# Patient Record
Sex: MALE | Race: WHITE | NOT HISPANIC OR LATINO | Employment: FULL TIME | URBAN - NONMETROPOLITAN AREA
[De-identification: names, ages, dates, MRNs, and addresses within clinical notes are randomized per-mention and may not be internally consistent; named-entity substitution may affect disease eponyms.]

---

## 2018-12-18 ENCOUNTER — OFFICE VISIT CONVERTED (OUTPATIENT)
Dept: FAMILY MEDICINE CLINIC | Age: 58
End: 2018-12-18
Attending: FAMILY MEDICINE

## 2019-03-25 ENCOUNTER — HOSPITAL ENCOUNTER (OUTPATIENT)
Dept: OTHER | Facility: HOSPITAL | Age: 59
Discharge: HOME | End: 2019-03-25
Attending: FAMILY MEDICINE

## 2019-03-25 ENCOUNTER — OFFICE VISIT CONVERTED (OUTPATIENT)
Dept: FAMILY MEDICINE CLINIC | Age: 59
End: 2019-03-25
Attending: FAMILY MEDICINE

## 2019-03-25 LAB — PSA SERPL-MCNC: 0.8 NG/ML (ref 0–4)

## 2019-05-13 ENCOUNTER — HOSPITAL ENCOUNTER (OUTPATIENT)
Dept: OTHER | Facility: HOSPITAL | Age: 59
Discharge: HOME | End: 2019-05-13
Attending: UROLOGY

## 2019-05-13 ENCOUNTER — OFFICE VISIT CONVERTED (OUTPATIENT)
Dept: UROLOGY | Facility: CLINIC | Age: 59
End: 2019-05-13
Attending: UROLOGY

## 2019-05-15 LAB — CONV RECTAL SCREEN FOR FLUOROQUINOLONE RESISTANT ORGANISMS: NORMAL

## 2019-06-14 ENCOUNTER — HOSPITAL ENCOUNTER (OUTPATIENT)
Dept: OTHER | Facility: HOSPITAL | Age: 59
Discharge: HOME | End: 2019-06-14
Attending: UROLOGY

## 2019-06-14 ENCOUNTER — PROCEDURE VISIT CONVERTED (OUTPATIENT)
Dept: UROLOGY | Facility: CLINIC | Age: 59
End: 2019-06-14
Attending: UROLOGY

## 2019-12-13 ENCOUNTER — OFFICE VISIT CONVERTED (OUTPATIENT)
Dept: FAMILY MEDICINE CLINIC | Age: 59
End: 2019-12-13
Attending: FAMILY MEDICINE

## 2019-12-16 ENCOUNTER — HOSPITAL ENCOUNTER (OUTPATIENT)
Dept: OTHER | Facility: HOSPITAL | Age: 59
Discharge: HOME | End: 2019-12-16
Attending: FAMILY MEDICINE

## 2019-12-16 LAB
ALBUMIN SERPL-MCNC: 4.4 G/DL (ref 3.5–5)
ALBUMIN/GLOB SERPL: 1.6 {RATIO} (ref 1.4–2.6)
ALP SERPL-CCNC: 77 U/L (ref 56–119)
ALT SERPL-CCNC: 22 U/L (ref 10–40)
ANION GAP SERPL CALC-SCNC: 16 MMOL/L (ref 8–19)
AST SERPL-CCNC: 20 U/L (ref 15–50)
BASOPHILS # BLD MANUAL: 0.04 10*3/UL (ref 0–0.2)
BASOPHILS NFR BLD MANUAL: 0.5 % (ref 0–3)
BILIRUB SERPL-MCNC: 0.56 MG/DL (ref 0.2–1.3)
BUN SERPL-MCNC: 16 MG/DL (ref 5–25)
BUN/CREAT SERPL: 16 {RATIO} (ref 6–20)
CALCIUM SERPL-MCNC: 9.6 MG/DL (ref 8.7–10.4)
CHLORIDE SERPL-SCNC: 97 MMOL/L (ref 99–111)
CHOLEST SERPL-MCNC: 172 MG/DL (ref 107–200)
CHOLEST/HDLC SERPL: 3.3 {RATIO} (ref 3–6)
CONV CO2: 26 MMOL/L (ref 22–32)
CONV TOTAL PROTEIN: 7.2 G/DL (ref 6.3–8.2)
CREAT UR-MCNC: 0.98 MG/DL (ref 0.7–1.2)
DEPRECATED RDW RBC AUTO: 44.4 FL
EOSINOPHIL # BLD MANUAL: 0.36 10*3/UL (ref 0–0.7)
EOSINOPHIL NFR BLD MANUAL: 4.6 % (ref 0–7)
ERYTHROCYTE [DISTWIDTH] IN BLOOD BY AUTOMATED COUNT: 12.8 % (ref 11.5–14.5)
GFR SERPLBLD BASED ON 1.73 SQ M-ARVRAT: >60 ML/MIN/{1.73_M2}
GLOBULIN UR ELPH-MCNC: 2.8 G/DL (ref 2–3.5)
GLUCOSE SERPL-MCNC: 126 MG/DL (ref 70–99)
GRANS (ABSOLUTE): 4.84 10*3/UL (ref 2–8)
GRANS: 61.4 % (ref 30–85)
HBA1C MFR BLD: 16 G/DL (ref 14–18)
HCT VFR BLD AUTO: 47.4 % (ref 42–52)
HDLC SERPL-MCNC: 52 MG/DL (ref 40–60)
IMM GRANULOCYTES # BLD: 0 10*3/UL (ref 0–0.54)
IMM GRANULOCYTES NFR BLD: 0 % (ref 0–0.43)
LDLC SERPL CALC-MCNC: 104 MG/DL (ref 70–100)
LYMPHOCYTES # BLD MANUAL: 1.85 10*3/UL (ref 1–5)
LYMPHOCYTES NFR BLD MANUAL: 10.1 % (ref 3–10)
MCH RBC QN AUTO: 31.6 PG (ref 27–31)
MCHC RBC AUTO-ENTMCNC: 33.8 G/DL (ref 33–37)
MCV RBC AUTO: 93.5 FL (ref 80–96)
MONOCYTES # BLD AUTO: 0.8 10*3/UL (ref 0.2–1.2)
OSMOLALITY SERPL CALC.SUM OF ELEC: 281 MOSM/KG (ref 273–304)
PLATELET # BLD AUTO: 244 10*3/UL (ref 130–400)
PMV BLD AUTO: 11.1 FL (ref 7.4–10.4)
POTASSIUM SERPL-SCNC: 4.8 MMOL/L (ref 3.5–5.3)
RBC # BLD AUTO: 5.07 10*6/UL (ref 4.7–6.1)
SODIUM SERPL-SCNC: 134 MMOL/L (ref 135–147)
TRIGL SERPL-MCNC: 81 MG/DL (ref 40–150)
TSH SERPL-ACNC: 1.3 M[IU]/L (ref 0.27–4.2)
VARIANT LYMPHS NFR BLD MANUAL: 23.4 % (ref 20–45)
VLDLC SERPL-MCNC: 16 MG/DL (ref 5–37)
WBC # BLD AUTO: 7.89 10*3/UL (ref 4.8–10.8)

## 2019-12-17 LAB
EST. AVERAGE GLUCOSE BLD GHB EST-MCNC: 126 MG/DL
HBA1C MFR BLD: 6 % (ref 3.5–5.7)

## 2020-01-14 ENCOUNTER — HOSPITAL ENCOUNTER (OUTPATIENT)
Dept: OTHER | Facility: HOSPITAL | Age: 60
Discharge: HOME | End: 2020-01-14
Attending: FAMILY MEDICINE

## 2020-07-07 ENCOUNTER — HOSPITAL ENCOUNTER (OUTPATIENT)
Dept: OTHER | Facility: HOSPITAL | Age: 60
Discharge: HOME | End: 2020-07-07
Attending: FAMILY MEDICINE

## 2020-07-07 LAB
ANION GAP SERPL CALC-SCNC: 20 MMOL/L (ref 8–19)
BUN SERPL-MCNC: 17 MG/DL (ref 5–25)
BUN/CREAT SERPL: 19 {RATIO} (ref 6–20)
CALCIUM SERPL-MCNC: 9.7 MG/DL (ref 8.7–10.4)
CHLORIDE SERPL-SCNC: 106 MMOL/L (ref 99–111)
CONV CO2: 19 MMOL/L (ref 22–32)
CREAT UR-MCNC: 0.89 MG/DL (ref 0.7–1.2)
GFR SERPLBLD BASED ON 1.73 SQ M-ARVRAT: >60 ML/MIN/{1.73_M2}
GLUCOSE SERPL-MCNC: 119 MG/DL (ref 70–99)
OSMOLALITY SERPL CALC.SUM OF ELEC: 293 MOSM/KG (ref 273–304)
POTASSIUM SERPL-SCNC: 4.5 MMOL/L (ref 3.5–5.3)
PSA SERPL-MCNC: 1.08 NG/ML (ref 0–4)
SODIUM SERPL-SCNC: 140 MMOL/L (ref 135–147)

## 2020-10-26 ENCOUNTER — OFFICE VISIT CONVERTED (OUTPATIENT)
Dept: PULMONOLOGY | Facility: CLINIC | Age: 60
End: 2020-10-26
Attending: INTERNAL MEDICINE

## 2020-10-26 ENCOUNTER — HOSPITAL ENCOUNTER (OUTPATIENT)
Dept: OTHER | Facility: HOSPITAL | Age: 60
Discharge: HOME | End: 2020-10-26
Attending: INTERNAL MEDICINE

## 2021-01-05 ENCOUNTER — OFFICE VISIT CONVERTED (OUTPATIENT)
Dept: FAMILY MEDICINE CLINIC | Age: 61
End: 2021-01-05
Attending: FAMILY MEDICINE

## 2021-02-19 ENCOUNTER — HOSPITAL ENCOUNTER (OUTPATIENT)
Dept: CT IMAGING | Facility: HOSPITAL | Age: 61
Discharge: HOME | End: 2021-02-19
Attending: THORACIC SURGERY (CARDIOTHORACIC VASCULAR SURGERY)

## 2021-04-02 ENCOUNTER — HOSPITAL ENCOUNTER (OUTPATIENT)
Dept: OTHER | Facility: HOSPITAL | Age: 61
Discharge: HOME | End: 2021-04-02
Attending: FAMILY MEDICINE

## 2021-04-02 LAB
ALBUMIN SERPL-MCNC: 4.4 G/DL (ref 3.5–5)
ALBUMIN/GLOB SERPL: 1.8 {RATIO} (ref 1.4–2.6)
ALP SERPL-CCNC: 80 U/L (ref 56–155)
ALT SERPL-CCNC: 32 U/L (ref 10–40)
ANION GAP SERPL CALC-SCNC: 14 MMOL/L (ref 8–19)
AST SERPL-CCNC: 25 U/L (ref 15–50)
BILIRUB SERPL-MCNC: 0.84 MG/DL (ref 0.2–1.3)
BUN SERPL-MCNC: 20 MG/DL (ref 5–25)
BUN/CREAT SERPL: 19 {RATIO} (ref 6–20)
CALCIUM SERPL-MCNC: 9.3 MG/DL (ref 8.7–10.4)
CHLORIDE SERPL-SCNC: 103 MMOL/L (ref 99–111)
CHOLEST SERPL-MCNC: 143 MG/DL (ref 107–200)
CHOLEST/HDLC SERPL: 2.1 {RATIO} (ref 3–6)
CONV CO2: 24 MMOL/L (ref 22–32)
CONV TOTAL PROTEIN: 6.9 G/DL (ref 6.3–8.2)
CREAT UR-MCNC: 1.04 MG/DL (ref 0.7–1.2)
ERYTHROCYTE [DISTWIDTH] IN BLOOD BY AUTOMATED COUNT: 13 % (ref 11.5–14.5)
GFR SERPLBLD BASED ON 1.73 SQ M-ARVRAT: >60 ML/MIN/{1.73_M2}
GLOBULIN UR ELPH-MCNC: 2.5 G/DL (ref 2–3.5)
GLUCOSE SERPL-MCNC: 113 MG/DL (ref 70–99)
HBA1C MFR BLD: 15.3 G/DL (ref 14–18)
HCT VFR BLD AUTO: 44.9 % (ref 42–52)
HDLC SERPL-MCNC: 67 MG/DL (ref 40–60)
LDLC SERPL CALC-MCNC: 64 MG/DL (ref 70–100)
MCH RBC QN AUTO: 31 PG (ref 27–31)
MCHC RBC AUTO-ENTMCNC: 34.1 G/DL (ref 33–37)
MCV RBC AUTO: 90.9 FL (ref 80–96)
OSMOLALITY SERPL CALC.SUM OF ELEC: 287 MOSM/KG (ref 273–304)
PLATELET # BLD AUTO: 252 10*3/UL (ref 130–400)
PMV BLD AUTO: 10.8 FL (ref 7.4–10.4)
POTASSIUM SERPL-SCNC: 4.4 MMOL/L (ref 3.5–5.3)
RBC # BLD AUTO: 4.94 10*6/UL (ref 4.7–6.1)
SODIUM SERPL-SCNC: 137 MMOL/L (ref 135–147)
TRIGL SERPL-MCNC: 62 MG/DL (ref 40–150)
TSH SERPL-ACNC: 0.79 M[IU]/L (ref 0.27–4.2)
VLDLC SERPL-MCNC: 12 MG/DL (ref 5–37)
WBC # BLD AUTO: 6.43 10*3/UL (ref 4.8–10.8)

## 2021-04-03 LAB
EST. AVERAGE GLUCOSE BLD GHB EST-MCNC: 126 MG/DL
HBA1C MFR BLD: 6 % (ref 3.5–5.7)

## 2021-05-15 VITALS — RESPIRATION RATE: 16 BRPM | BODY MASS INDEX: 30.37 KG/M2 | HEIGHT: 72 IN | WEIGHT: 224.25 LBS

## 2021-05-15 VITALS — HEIGHT: 72 IN | WEIGHT: 227 LBS | BODY MASS INDEX: 30.75 KG/M2 | RESPIRATION RATE: 16 BRPM

## 2021-05-18 NOTE — PROGRESS NOTES
"Ry Santo 1960     Office/Outpatient Visit    Visit Date: Tue, Dec 18, 2018 03:59 pm    Provider: Ward Rdz MD (Assistant: Mindy Santo RN)    Location: [facility]        Electronically signed by Ward Rdz MD on  12/21/2018 12:48:09 PM                             SUBJECTIVE:        CC: physical exam         HPI:     Simin is in today for wellness exam.  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  He has been with his current company for 18 years.  He quit smoking in 2005 or so.  He has just about quit using alcohol.  He is up to date on colonoscopy and has no history of colon cancer.  He does have hypertension and remains on treatment for that.     ROS:     CONSTITUTIONAL:  Negative for chills and fever.      CARDIOVASCULAR:  Negative for chest pain and palpitations.      RESPIRATORY:  Negative for recent cough and dyspnea.      GASTROINTESTINAL:  Negative for abdominal pain, nausea and vomiting.      INTEGUMENTARY:  Negative for atypical mole(s) and rash.          [facility]/:     Last Reviewed on 12/18/2018 04:13 PM by Ward Rdz    Past Medical History:             PAST MEDICAL HISTORY         Hypertension         PREVENTIVE HEALTH MAINTENANCE             COLORECTAL CANCER SCREENING: Up to date (colonoscopy q10y; sigmoidoscopy q5y; Cologuard q3y) was last done 05/2012; colonoscopy with normal results; diverticulosis         Surgical History:         L Fingers;         Family History:         Positive for Coronary Artery Disease ( father ).      Positive for Type 2 Diabetes ( mother ).          Social History:     Occupation: Employed at [employer]     Marital Status:      Children: 1 child         Tobacco/Alcohol/Supplements:     Last Reviewed on 12/18/2018 04:13 PM by Ward Rdz    Tobacco: Past history of cigarette smoking; quit February 2008.  \"twelve-pack a week\"         Substance Abuse History:     Last Reviewed on 12/18/2018 " 04:13 PM by Ward Rdz    NEGATIVE         Mental Health History:     Last Reviewed on 12/18/2018 04:13 PM by Ward Rdz        Communicable Diseases (eg STDs):     Last Reviewed on 12/18/2018 04:13 PM by Ward Rdz            Current Problems:     Last Reviewed on 12/18/2018 04:13 PM by Ward Rdz    Hypertension     Erectile dysfunction due to organic reasons     Screening for rectal cancer     Screening for prostate cancer         Immunizations:     None        Allergies:     Last Reviewed on 12/18/2018 04:13 PM by Ward Rdz    Penicillins:    Aspirin:        Current Medications:     Last Reviewed on 12/18/2018 04:13 PM by Ward Rdz    Lisinopril/Hydrochlorothiazide 20mg/12.5mg Tablet Take 1 tablet(s) by mouth daily     Multivitamins         OBJECTIVE:        Vitals:         Current: 12/18/2018 4:01:49 PM    Ht:  6 ft, 1.75 in;  Wt: 232 lbs;  BMI: 30.0    T: 98 F (oral);  BP: 126/82 mm Hg (left arm, sitting);  P: 91 bpm (left arm (BP Cuff), sitting);  sCr: 0.89 mg/dL;  GFR: 100.17        Exams:     PHYSICAL EXAM:     GENERAL: Vitals recorded well developed, well nourished;     EYES: extraocular movements intact; conjunctiva and cornea are normal; PERRL;     E/N/T: EARS:  normal external auditory canals and tympanic membranes;  grossly normal hearing; OROPHARYNX:  normal mucosa, dentition, gingiva, and posterior pharynx;     NECK: range of motion is normal; thyroid is non-palpable;     RESPIRATORY: normal respiratory rate and pattern with no distress; normal breath sounds with no rales, rhonchi, wheezes or rubs;     CARDIOVASCULAR: normal rate; rhythm is regular;  no systolic murmur;     GASTROINTESTINAL: nontender; normal bowel sounds; no masses; rectal exam: normal tone; nontender, guaiac negative stool;     GENITOURINARY: prostate:  no nodules, tenderness, or enlargement;     SKIN:  no significant rashes or lesions; no suspicious moles;      NEUROLOGIC: mental status: alert and oriented x 3; cranial nerves II-XII grossly intact;     PSYCHIATRIC: appropriate affect and demeanor; normal psychomotor function;         Procedures:     Vaccination against hepatitis A     1. Hepatitis A (adult): 1.0 ml given IM in the left upper arm; administered by [facility];  lot number [id number]; expires 11/18/19             ASSESSMENT           V70.0   Z00.00  Yearly physical exam              DDx:     V76.41   Z12.12  Screening for rectal cancer              DDx:     V76.44   Z12.5  Screening for prostate cancer              DDx:     V05.3   Z23  Vaccination against hepatitis A              DDx:         ORDERS:         Meds Prescribed:       Refill of: Lisinopril/Hydrochlorothiazide 20mg/12.5mg Tablet Take 1 tablet(s) by mouth daily  #90 (Ninety) tablet(s) Refills: 3         Radiology/Test Orders:       3017F  Colorectal CA screen results documented and reviewed (PV)  (In-House)           Lab Orders:       50090  Occult blood, fecal  (In-House)         89897  [facility]  ([facility])         02412  [facility]: CMP, CBC,LIPID, PRSAS-, TSH 69128, 59697, 24500, 25339, , 27825  (Send-Out)           Procedures Ordered:       91649  Immunization administration; one vaccine  (In-House)         41256  HepA vaccine adult dose for intramuscular use  (In-House)           Other Orders:         Negative EtOH screen  (In-House)           Calculated BMI above the upper parameter and a follow-up plan was documented in the medical record  (In-House)           Depression screen negative  (In-House)                   PLAN:          Yearly physical exam     LABORATORY:  Labs ordered to be performed today include HgbA1C and MALE PHYSICAL: CMP, CBC, LIPID, PSA, TSH.      RECOMMENDATIONS given include: Today, we have reviewed Simin's care.  I'm going to arrange labs for him as noted below and go from there.  The right side of his prostate is  slightly different than the left, but I don't think I feel a concerning nodule.  We will see what his blood work shows and go from there.  No changes are anticipated..  MIPS PHQ-9 Depression Screening Completed form scanned and in chart; Total Score 1 Negative alcohol screen     COLORECTAL CANCER SCREENING: Results are in chart     BMI Elevated - Follow-Up Plan: He was provided education on weight loss strategies           Orders:       93735  A1C - [facility] Hemoglobin A1C  (Send-Out)         42346  PHYS - [facility] MALE PHYSICAL: CMP, CBC,LIPID, PRSAS-, TSH 64276, 68097, 95920, 15015, , 91630  (Send-Out)           Negative EtOH screen  (In-House)         3017F  Colorectal CA screen results documented and reviewed (PV)  (In-House)           Calculated BMI above the upper parameter and a follow-up plan was documented in the medical record  (In-House)           Depression screen negative  (In-House)             Patient Education Handouts:       Physical Exam 50-59 year, Male           Screening for rectal cancer           Orders:       31958  Occult blood, fecal  (In-House)            Screening for prostate cancer As above.          Vaccination against hepatitis A           Orders:       04635  Immunization administration; one vaccine  (In-House)         68792  HepA vaccine adult dose for intramuscular use  (In-House)               Other Prescriptions:       Refill of: Lisinopril/Hydrochlorothiazide 20mg/12.5mg Tablet Take 1 tablet(s) by mouth daily  #90 (Ninety) tablet(s) Refills: 3         CHARGE CAPTURE           **Please note: ICD descriptions below are intended for billing purposes only and may not represent clinical diagnoses**        Primary Diagnosis:         V70.0 Yearly physical exam            Z00.00    Encntr for general adult medical exam w/o abnormal findings              Orders:          66011   Preventive medicine, established patient, age 40-64 years  (In-House)                 Negative EtOH screen  (In-House)             3017F   Colorectal CA screen results documented and reviewed (PV)  (In-House)                Calculated BMI above the upper parameter and a follow-up plan was documented in the medical record  (In-House)                Depression screen negative  (In-House)           V76.41 Screening for rectal cancer            Z12.12    Encounter for screening for malignant neoplasm of rectum              Orders:          65957   Occult blood, fecal  (In-House)           V76.44 Screening for prostate cancer            Z12.5    Encounter for screening for malignant neoplasm of prostate    V05.3 Vaccination against hepatitis A            Z23    Encounter for immunization              Orders:          11988   Immunization administration; one vaccine  (In-House)             89113   HepA vaccine adult dose for intramuscular use  (In-House)

## 2021-05-18 NOTE — PROGRESS NOTES
"Ry Santo 1960     Office/Outpatient Visit    Visit Date: Mon, Mar 25, 2019 03:15 pm    Provider: Ward Rdz MD (Assistant: Mindy Santo RN)    Location: [facility]        Electronically signed by Ward Rdz MD on  03/27/2019 04:41:35 PM                             SUBJECTIVE:        CC: follow up on prostate         HPI:     Simin was noted on his last exam to have some firmness of the prostate on the right side.  He did have normal PSA, but I wanted him to come back in for recheck as a precaution.  He denies any urinary issues.     ROS:     CONSTITUTIONAL:  Negative for chills and fever.      CARDIOVASCULAR:  Negative for chest pain and palpitations.      RESPIRATORY:  Negative for recent cough and dyspnea.      GASTROINTESTINAL:  Negative for abdominal pain, nausea and vomiting.          [facility]/:     Last Reviewed on 3/25/2019 03:34 PM by Ward Rdz    Past Medical History:             PAST MEDICAL HISTORY         Hypertension         PREVENTIVE HEALTH MAINTENANCE             COLORECTAL CANCER SCREENING: Up to date (colonoscopy q10y; sigmoidoscopy q5y; Cologuard q3y) was last done 05/2012; colonoscopy with normal results; diverticulosis         Surgical History:         L Fingers;         Family History:         Positive for Coronary Artery Disease ( father ).      Positive for Type 2 Diabetes ( mother ).          Social History:     Occupation: Employed at [employer]     Marital Status:      Children: 1 child         Tobacco/Alcohol/Supplements:     Last Reviewed on 3/25/2019 03:34 PM by Ward Rdz    Tobacco: Past history of cigarette smoking; quit February 2008.  \"twelve-pack a week\"         Substance Abuse History:     Last Reviewed on 3/25/2019 03:34 PM by Ward Rdz    NEGATIVE         Mental Health History:     Last Reviewed on 3/25/2019 03:34 PM by Ward Rdz        Communicable Diseases (eg STDs): "     Last Reviewed on 3/25/2019 03:34 PM by Ward Rdz            Current Problems:     Last Reviewed on 3/25/2019 03:34 PM by Ward Rdz    Nodular prostate, without urinary obstruction     Hypertension     Erectile dysfunction due to organic reasons     Screening for rectal cancer     Screening for prostate cancer         Immunizations:     VAQTA -adult dose (HepA) 12/18/2018         Allergies:     Last Reviewed on 3/25/2019 03:34 PM by Ward Rdz    Penicillins:    Aspirin:        Current Medications:     Last Reviewed on 3/25/2019 03:34 PM by Ward Rdz    Lisinopril/Hydrochlorothiazide 20mg/12.5mg Tablet Take 1 tablet(s) by mouth daily     Multivitamins         OBJECTIVE:        Vitals:         Current: 3/25/2019 3:20:36 PM    Ht:  6 ft, 1.75 in;  Wt: 219.4 lbs;  BMI: 28.4    T: 97.7 F (oral);  BP: 125/83 mm Hg (left arm, sitting);  P: 71 bpm (left arm (BP Cuff), sitting);  sCr: 0.86 mg/dL;  GFR: 101.24        Exams:     PHYSICAL EXAM:     GENERAL: Vitals recorded well developed, well nourished;     GENITOURINARY: prostate: there is an area of mild thickening and firmness on the right side of the prostate as compared to the left;     SKIN:  no significant rashes or lesions; no suspicious moles;         ASSESSMENT           600.10   N40.2  Nodular prostate, without urinary obstruction              DDx:         ORDERS:         Lab Orders:       62933  PSA - [facility] PSA DIAGNOSTIC  (Send-Out)                   PLAN:          Nodular prostate, without urinary obstruction     LABORATORY:  Labs ordered to be performed today include PSA Diagnostic purpose.      RECOMMENDATIONS given include: Simin does have an area of firmness on the right side of the prostate.  My gut feeling is that it is not worrisome, but I am going to repeat a PSA today and likely refer him to urology for evaluation as a precaution..            Orders:       83379  PSA - [facility] PSA DIAGNOSTIC  (Send-Out)                CHARGE CAPTURE           **Please note: ICD descriptions below are intended for billing purposes only and may not represent clinical diagnoses**        Primary Diagnosis:         600.10 Nodular prostate, without urinary obstruction            N40.2    Nodular prostate without lower urinary tract symptoms              Orders:          20239   Office/outpatient visit; established patient, level 2  (In-House)

## 2021-05-18 NOTE — PROGRESS NOTES
Ry Santo  1960     Office/Outpatient Visit    Visit Date: Tue, Jan 5, 2021 03:33 pm    Provider: Ward Rdz MD (Assistant: Shaista Feng MA)    Location: [facility]        Electronically signed by Ward Rdz MD on  01/06/2021 07:37:32 PM                             Subjective:        CC: physical exam    HPI:       Simin is in today for wellness exam.  He is 60 years old and works in [employer].  He has been with his current company for 20+ years.  He quit smoking in 2005 or so.  He does drink some during the summer, but he tends to drink less in the winter.  He is up to date on colonoscopy and has no history of colon cancer in the family.  He does have hypertension and remains on treatment for that.      ROS:     CONSTITUTIONAL:  Negative for chills and fever.      CARDIOVASCULAR:  Negative for chest pain and palpitations.      RESPIRATORY:  Negative for recent cough and dyspnea.      GASTROINTESTINAL:  Negative for abdominal pain, nausea and vomiting.      INTEGUMENTARY:  Negative for atypical mole(s) and rash.          Past Medical History / Family History / Social History:         Last Reviewed on 1/05/2021 03:47 PM by Ward Rdz    Past Medical History:             PAST MEDICAL HISTORY         Hypertension         PREVENTIVE HEALTH MAINTENANCE             COLORECTAL CANCER SCREENING: Up to date (colonoscopy q10y; sigmoidoscopy q5y; Cologuard q3y) was last done 05/2012; colonoscopy with normal results; diverticulosis     LOW DOSE CT: was last done 01/2020 with the following abnormality noted-- few lung nodules         Surgical History:         L Fingers;         Family History:     Father: Aortic Aneurysm; Coronary Artery Disease;  Cerebrovascular Accident     Mother: Type 2 Diabetes         Social History:     Occupation: Employed at [employer]     Marital Status:      Children: 1 child         Tobacco/Alcohol/Supplements:     Last  "Reviewed on 1/05/2021 03:47 PM by Ward Rdz    Tobacco: Past history of cigarette smoking; quit February 2008.  Simin started smoking at age 15.  He stopped smoking around 48.  During the 32 years he smoked about 1.5 packs daily.  He has a roughly 46 pack year history. \"twelve-pack a week\"         Substance Abuse History:     Last Reviewed on 1/05/2021 03:47 PM by Ward Rdz    NEGATIVE         Mental Health History:     Last Reviewed on 1/05/2021 03:47 PM by Ward Rdz        Communicable Diseases (eg STDs):     Last Reviewed on 1/05/2021 03:47 PM by Ward Rdz        Current Problems:     Last Reviewed on 1/05/2021 03:47 PM by Ward Rdz    Encounter for screening for malignant neoplasm of rectum    Encounter for screening for malignant neoplasm of prostate    Mixed hyperlipidemia    Essential (primary) hypertension    Nodular prostate without lower urinary tract symptoms    Encounter for general adult medical examination with abnormal findings        Immunizations:     VAQTA -adult dose (HepA) 12/18/2018    VAQTA -adult dose (HepA) 6/24/2019    influenza, injectable, quadrivalent, preservative free 1/5/2021        Allergies:     Last Reviewed on 1/05/2021 03:47 PM by Ward Rdz    Penicillins:      Aspirin:          Current Medications:     Last Reviewed on 1/05/2021 03:47 PM by Ward Rdz    Multivitamins     lisinopriL-hydrochlorothiazide 20-12.5 mg oral tablet [TAKE 1 TABLET(S) BY MOUTH DAILY]    clindamycin phosphate 1 % Topical Solution [apply a thin layer to the affected area(s) by topical route 2 times per day]        Objective:        Vitals:         Current: 1/5/2021 3:36:42 PM    Ht:  6 ft, 1.75 in;  Wt: 213 lbs;  BMI: 27.5T: 97.5 F (temporal);  BP: 148/87 mm Hg (right arm, sitting);  P: 79 bpm (right arm (BP Cuff), sitting);  sCr: 0.89 mg/dL;  GFR: 94.29        Repeat:     4:5:0 PM  BP:   136/85mm Hg (right arm, " sitting, HR: 79)     Exams:     PHYSICAL EXAM:     GENERAL: Vitals recorded well developed, well nourished;     EYES: extraocular movements intact; conjunctiva and cornea are normal; PERRL;     E/N/T: EARS:  normal external auditory canals and tympanic membranes;  grossly normal hearing; OROPHARYNX:  normal mucosa, dentition, gingiva, and posterior pharynx;     NECK: range of motion is normal; thyroid is non-palpable;     RESPIRATORY: normal respiratory rate and pattern with no distress; normal breath sounds with no rales, rhonchi, wheezes or rubs;     CARDIOVASCULAR: normal rate; rhythm is regular;  no systolic murmur;     GASTROINTESTINAL: nontender; normal bowel sounds; no masses; rectal exam: normal tone; nontender, guaiac negative stool;     GENITOURINARY: prostate:  no nodules, tenderness, or enlargement;     LYMPHATIC: no enlargement of cervical or facial nodes; no supraclavicular nodes;     SKIN:  no significant rashes or lesions; no suspicious moles;     NEUROLOGIC: mental status: alert and oriented x 3; cranial nerves II-XII grossly intact;     PSYCHIATRIC: appropriate affect and demeanor; normal psychomotor function; Declines having a chaperone present during exam.          Assessment:         Z00.01   Encounter for general adult medical examination with abnormal findings       Z12.12   Encounter for screening for malignant neoplasm of rectum       Z12.5   Encounter for screening for malignant neoplasm of prostate           ORDERS:         Meds Prescribed:       [Refilled] lisinopriL-hydrochlorothiazide 20-12.5 mg oral tablet [take 1 tablet by oral route once daily], #90 (ninety) tablets, Refills: 3 (three)       [New Rx] rosuvastatin 10 mg oral tablet [take 1 tablet (10 mg) by oral route once daily], #90 (ninety) tablets, Refills: 3 (three)         Radiology/Test Orders:       3017F  Colorectal CA screen results documented and reviewed (PV)  (In-House)              Lab Orders:       [id number]  BDCB2 - [facility]  CBC w/o diff  (Send-Out)            63206  COMP - [facility] Comp. Metabolic Panel  (Send-Out)            56613  LPDP - [facility] Lipid Panel  (Send-Out)            25886  TSH - [facility] TSH  (Send-Out)            22888  Occult blood, fecal  (In-House)              Other Orders:         Depression screen negative  (In-House)                      Plan:         Encounter for general adult medical examination with abnormal findings    LABORATORY:  Labs ordered to be performed today include CBC W/O DIFF, Comprehensive metabolic panel, lipid panel, and TSH.      RECOMMENDATIONS given include: Today, we have reviewed Simin's care.  I'm going to recommend no specific change regarding the pressure.  We will repeat that for him prior to leaving.  He did have cholesterol lowering medication recommended by cardiology.  We will delay labs for about 6 weeks at this time.  No other changes are anticipated..  MIPS PHQ-9 Depression Screening: Completed form scanned and in chart; Total Score 2; Negative Depression Screen           Prescriptions:       [Refilled] lisinopriL-hydrochlorothiazide 20-12.5 mg oral tablet [take 1 tablet by oral route once daily], #90 (ninety) tablets, Refills: 3 (three)       [New Rx] rosuvastatin 10 mg oral tablet [take 1 tablet (10 mg) by oral route once daily], #90 (ninety) tablets, Refills: 3 (three)           Orders:       16551  [facility] - [facility] CBC w/o diff  (Send-Out)            83208  COMP [facility] Comp. Metabolic Panel  (Send-Out)            49511  LPDP [facility] Lipid Panel  (Send-Out)            37253  TSH [facility] TSH  (Send-Out)              Depression screen negative  (In-House)            3017F  Colorectal CA screen results documented and reviewed (PV)  (In-House)              Encounter for screening for malignant neoplasm of rectum          Orders:       76481  Occult blood, fecal  (In-House)              Encounter for screening for malignant neoplasm of prostateAs above.            Charge Capture:          Primary Diagnosis:     Z00.01  Encounter for general adult medical examination with abnormal findings           Orders:      93969  Preventive medicine, established patient, age 40-64 years  (In-House)              Depression screen negative  (In-House)            3017F  Colorectal CA screen results documented and reviewed (PV)  (In-House)              Z12.12  Encounter for screening for malignant neoplasm of rectum           Orders:      95158  Occult blood, fecal  (In-House)              Z12.5  Encounter for screening for malignant neoplasm of prostate

## 2021-05-18 NOTE — PROGRESS NOTES
Ry Santo  1960     Office/Outpatient Visit    Visit Date: Fri, Dec 13, 2019 01:57 pm    Provider: Ward Rdz MD (Assistant: Iva Frost, )    Location: [facility]        Electronically signed by Ward Rdz MD on  12/14/2019 07:03:38 AM                             Subjective:        CC: physical exam    HPI:       Simin is in today for wellness exam.  He is 59 years old and works in [employer].  He has been with his current company for 19 years.  He quit smoking in 2005 or so.  He does drink some during the summer, but he tends to drink less in the winter.  He is up to date on colonoscopy and has no history of colon cancer.  He does have hypertension and remains on treatment for that.     ROS:     CONSTITUTIONAL:  Negative for chills and fever.      CARDIOVASCULAR:  Negative for chest pain and palpitations.      RESPIRATORY:  Positive for recent cough.   Negative for dyspnea.      GASTROINTESTINAL:  Negative for abdominal pain, nausea and vomiting.      INTEGUMENTARY:  Negative for atypical mole(s) and rash.          Past Medical History / Family History / Social History:         Last Reviewed on 12/13/2019 02:23 PM by Ward Rdz    Past Medical History:             PAST MEDICAL HISTORY         Hypertension         PREVENTIVE HEALTH MAINTENANCE             COLORECTAL CANCER SCREENING: Up to date (colonoscopy q10y; sigmoidoscopy q5y; Cologuard q3y) was last done 05/2012; colonoscopy with normal results; diverticulosis         Surgical History:         L Fingers;         Family History:     Father: Aortic Aneurysm; Coronary Artery Disease;  Cerebrovascular Accident     Mother: Type 2 Diabetes         Social History:     Occupation: Employed at [employer]     Marital Status:      Children: 1 child         Tobacco/Alcohol/Supplements:     Last Reviewed on 12/13/2019 02:23 PM by Ward Rdz    Tobacco: Past history of cigarette  "smoking; quit February 2008.  Simin started smoking at age 15.  He stopped smoking around 48.  During the 33 years he smoked about 1.5 packs daily.  He has a roughly 46 pack year history. \"twelve-pack a week\"         Substance Abuse History:     Last Reviewed on 12/13/2019 02:23 PM by Ward Rdz    NEGATIVE         Mental Health History:     Last Reviewed on 12/13/2019 02:23 PM by Ward Rdz        Communicable Diseases (eg STDs):     Last Reviewed on 12/13/2019 02:23 PM by Ward Rdz        Current Problems:     Last Reviewed on 12/13/2019 02:23 PM by Ward Rdz    Encounter for screening for malignant neoplasm of rectum    Encounter for screening for malignant neoplasm of prostate    Mixed hyperlipidemia    Essential (primary) hypertension    Nodular prostate without lower urinary tract symptoms    Encounter for general adult medical examination with abnormal findings        Immunizations:     VAQTA -adult dose (HepA) 12/18/2018    VAQTA -adult dose (HepA) 6/24/2019        Allergies:     Last Reviewed on 12/13/2019 02:23 PM by Ward Rdz    Penicillins:      Aspirin:          Current Medications:     Last Reviewed on 12/13/2019 02:23 PM by Ward Rdz    Multivitamins     Lisinopril/Hydrochlorothiazide 20mg/12.5mg Tablet [Take 1 tablet(s) by mouth daily]        Objective:        Vitals:         Current: 12/13/2019 2:02:20 PM    Ht:  6 ft, 1.75 in;  Wt: 236.6 lbs;  BMI: 30.6T: 97.8 F (oral);  BP: 143/86 mm Hg (right arm, sitting);  P: 93 bpm (right arm (BP Cuff), sitting);  sCr: 0.86 mg/dL;  GFR: 103.28        Repeat:     2:39:11 PM  BP:   145/93mm Hg (right arm, sitting, HR: 87)     Exams:     PHYSICAL EXAM:     GENERAL: Vitals recorded well developed, well nourished;     EYES: extraocular movements intact; conjunctiva and cornea are normal; PERRL;     E/N/T: EARS:  normal external auditory canals and tympanic membranes;  grossly normal " hearing; OROPHARYNX:  normal mucosa, dentition, gingiva, and posterior pharynx;     NECK: range of motion is normal; thyroid is non-palpable;     RESPIRATORY: normal respiratory rate and pattern with no distress; normal breath sounds with no rales, rhonchi, wheezes or rubs;     CARDIOVASCULAR: normal rate; rhythm is regular;  no systolic murmur;     GASTROINTESTINAL: nontender; normal bowel sounds; no masses;     LYMPHATIC: no enlargement of cervical or facial nodes; no supraclavicular nodes;     SKIN:  no significant rashes or lesions; no suspicious moles;     NEUROLOGIC: mental status: alert and oriented x 3; cranial nerves II-XII grossly intact;     PSYCHIATRIC: appropriate affect and demeanor; normal psychomotor function;         Assessment:         Z00.01   Encounter for general adult medical examination with abnormal findings           ORDERS:         Meds Prescribed:       [Refilled] lisinopril-hydrochlorothiazide 20-12.5 mg oral tablet [Take 1 tablet(s) by mouth daily], #90 (ninety) tablets, Refills: 3 (three)       [New Rx] clindamycin phosphate 1 % Topical Solution [apply a thin layer to the affected area(s) by topical route 2 times per day], #60 (sixty) milliliters, Refills: 11 (eleven)         Radiology/Test Orders:       3017F  Colorectal CA screen results documented and reviewed (PV)  (In-House)              Lab Orders:       [id number]  [facility] PHYSICAL: CMP, CBC, TSH, LIPID: [id number], [id number], [id number], [id number]  (Send-Out)    PLEASE COME BACK FOR FASTING LABS - THANKS          Procedures Ordered:         Low Dose CT scan (LDCT) for lung cancer screening  (Send-Out)              Other Orders:         Depression screen negative  (In-House)              Counseling visit to discuss lung cancer screening using low dose CT scan  (In-House)                      Plan:         Encounter for general adult medical examination with abnormal findings    LABORATORY:  Labs ordered to be performed today include  PHYSICAL PANEL; CMP, CBC, TSH, LIPID.      RECOMMENDATIONS given include: Today, we have reviewed Simin's care.  I'm going to refill his medications as noted below and update labs.  No changes are anticipated.  He seems well..  MIPS PHQ-9 Depression Screening: Completed form scanned and in chart; Total Score 0; Negative Depression Screen LDCT Counseling: Discussed benefits/harms of screening, follow-up diagnostic testing, over-diagnosis, false positive rate, and total radiation exposure. Counseled on importance of adherence to annual LDCT screenings, impact of co-morbidities, and ability/willingness to undergo diagnosis and treatment. Counseled on the importance of maintaining cigarette smoking abstinence. I will order the Low Dose CT today.            Orders:       [id number]  [facility] PHYSICAL: CMP, CBC, TSH, LIPID: [id number], [id number], [id number], [id number]  (Send-Out)    PLEASE COME BACK FOR FASTING LABS - THANKS          Depression screen negative  (In-House)            3017F  Colorectal CA screen results documented and reviewed (PV)  (In-House)              Counseling visit to discuss lung cancer screening using low dose CT scan  (In-House)              Low Dose CT scan (LDCT) for lung cancer screening  (Send-Out)                Patient Education Handouts:       Physical Exam 60+ year, Male              Other Prescriptions:       [Refilled] lisinopril-hydrochlorothiazide 20-12.5 mg oral tablet [Take 1 tablet(s) by mouth daily], #90 (ninety) tablets, Refills: 3 (three)       [New Rx] clindamycin phosphate 1 % Topical Solution [apply a thin layer to the affected area(s) by topical route 2 times per day], #60 (sixty) milliliters, Refills: 11 (eleven)         Charge Capture:         Primary Diagnosis:     Z00.01  Encounter for general adult medical examination with abnormal findings           Orders:      82082  Preventive medicine, established patient, age 40-64 years  (In-House)              Depression screen  negative  (In-House)            3017F  Colorectal CA screen results documented and reviewed (PV)  (In-House)              Counseling visit to discuss lung cancer screening using low dose CT scan  (In-House)

## 2021-05-28 VITALS
OXYGEN SATURATION: 97 % | RESPIRATION RATE: 16 BRPM | HEART RATE: 78 BPM | BODY MASS INDEX: 28.79 KG/M2 | WEIGHT: 217.25 LBS | SYSTOLIC BLOOD PRESSURE: 150 MMHG | HEIGHT: 73 IN | TEMPERATURE: 98.2 F | DIASTOLIC BLOOD PRESSURE: 94 MMHG

## 2021-05-28 NOTE — PROGRESS NOTES
Patient: ELIAS PARNELL     Acct: [id number]     Report: #[id number]  UNIT #: [id number]     : 1960    Encounter Date:10/26/2020  PRIMARY CARE: ROBSON WATT  ***Signed***  --------------------------------------------------------------------------------------------------------------------  Chief Complaint      Encounter Date      Oct 26, 2020            Primary Care Provider      ROBSON WATT            Referring Provider      ROBSON WATT            Patient Complaint      Patient is complaining of      New Patient- Lung nodule            VITALS      Height 6 ft 1 in / 185.42 cm      Weight 217 lbs 4 oz / 98.420949 kg      BSA 2.23 m2      BMI 28.7 kg/m2      Temperature 98.2 F / 36.78 C - Tympanic      Pulse 78      Respirations 16      Blood Pressure 150/94 Sitting, Left Arm      Pulse Oximetry 97%, room air            HPI      The patient is a very pleasant 60 year old  male former cigarette     smoker whose last cigarette was in  here for incidentally found lung     nodules.  The patient had an low dose CT screening for lung cancer screening in     2020 showing multiple lung nodules, the largest 6 mm in the right middle lobe    bronchus.  He was trying to get a repeat CT done, but his insurance did not     cover it for a low dose CT. The patient is here today for evaluation of his lung    nodules.  He has some dyspnea, worse with walking about 2000 feet or up a flight    of steps. It is mild in severity, worse with exertion and relieved with rest. He    denies any coughing, wheezing, headaches, chest pain or hemoptysis.  Denies any     nausea, vomiting, fevers or chills.  Denies any weight loss.  Otherwise he is     doing well.  He is able to perform ADLs without difficulty.  Denies any swollen     glands or lymph nodes of the head and neck.            I have personally reviewed the review of systems, past family, social, surgical     and medical histories and I agree with the  findings.            ROS      Constitutional:  Denies: Fatigue, Fever, Weight gain, Weight loss, Chills,     Insomnia, Other      Respiratory/Breathing:  Denies: Shortness of air, Wheezing, Cough, Hemoptysis,     Pleuritic pain, Other      Endocrine:  Denies: Polydipsia, Polyuria, Heat/cold intolerance, Diabetes, Other      Eyes:  Denies: Blurred vision, Vision Changes, Other      Ears, nose, mouth, throat:  Denies: Mouth lesions, Thrush, Throat pain,     Hoarseness, Allergies/Hay Fever, Post Nasal Drip, Headaches, Recent Head Injury,    Nose Bleeding, Neck Stiffness, Thyroid Mass, Hearing Loss, Ear Fullness, Dry     Mouth, Nasal or Sinus Pain, Dry Lips, Nasal discharge, Nasal congestion, Other      Cardiovascular:  Denies: Palpitations, Syncope, Claudication, Chest Pain, Wake     up Gasping for air, Leg Swelling, Irregular Heart Rate, Cyanosis, Dyspnea on     Exertion, Other      Gastrointestinal:  Denies: Nausea, Constipation, Diarrhea, Abdominal pain,     Vomiting, Difficulty Swallowing, Reflux/Heartburn, Dysphagia, Jaundice,     Bloating, Melena, Bloody stools, Other      Genitourinary:  Denies: Urinary frequency, Incontinence, Hematuria, Urgency,     Nocturia, Dysuria, Testicular problems, Other      Musculoskeletal:  Denies: Joint Pain, Joint Stiffness, Joint Swelling, Myalgias,    Other      Hematologic/lymphatic:  DENIES: Lymphadenopathy, Bruising, Bleeding tendencies,     Other      Neurological:  Denies: Headache, Numbness, Weakness, Seizures, Other      Psychiatric:  Denies: Anxiety, Appropriate Effect, Depression, Other      Sleep:  No: Excessive daytime sleep, Morning Headache?, Snoring, Insomnia?, Stop    breathing at sleep?, Other      Integumentary:  Denies: Rash, Dry skin, Skin Warm to Touch, Other      Immunologic/Allergic:  Denies: Latex allergy, Seasonal allergies, Asthma,     Urticaria, Eczema, Other      Immunization status:  No: Up to date            FAMILY/SOCIAL/MEDICAL HX      Surgical  History:  No: AAA Repair, Abdominal Surgery, Adenoids, Angioplasty,     Appendectomy, Back Surgery, Bladder Surgery, Bowel Surgery, Breast Surgery,     CABG, Carotid Stenosis, Cholecystectomy, Ear Surgery, Eye Surgery, Head Surgery,    Hernia Surgery, Kidney Surgery, Nose Surgery, Oral Surgery, Orthopedic Surgery,     Prostatectomy, Rectal Surgery, Spinal Surgery, Testicular Surgery, Throat     Surgery, Tonsils, Valve Replacement, Vascular Surgery, Other Surgeries      Stroke - Family Hx:  Father      Diabetes - Family Hx:  Mother      Cancer/Type - Family Hx:  Brother (melanoma), Sister (pancreatic cancer)      Is Father Still Living?:  No      Is Mother Still Living?:  No       Family History:  Yes      Social History:  No Tobacco Use, No Alcohol Use, No Recreational Drug use      Smoking status:  Former smoker (quit  in 2007, 2 ppd x 30 years)      Anticoagulation Therapy:  No      Antibiotic Prophylaxis:  No      Medical History:  Yes: Diverticulitis, High Blood Pressure      Psychiatric History      none            PREVENTION      Hx Influenza Vaccination:  Yes      Date Influenza Vaccine Given:  Oct 26, 2020      Influenza Vaccine Declined:  No      2 or More Falls in Past Year?:  No      Fall Past Year with Injury?:  No      Hx Pneumococcal Vaccination:  No      Encouraged to follow-up with:  PCP regarding preventative exams.      Chart initiated by      Pricilla Palma CMA            ALLERGIES/MEDICATIONS      Allergies:        Coded Allergies:             No Known Drug Allergies (Verified  Allergy, Intermediate, 10/26/20)      Medications    Last Reconciled on 10/26/20 08:34 by MARII ALDANA MD      Multivitamins (Multi-Vitamin) 1 Each Tablet      1 TAB PO QDAY, #30 TAB 0 Refills         Reported         10/26/20       Lisinopril* (Lisinopril*) Unknown Strength Tablet      PO QDAY, #15 TAB 0 Refills         Reported         10/26/20      Current Medications      Current Medications Reviewed 10/26/20             EXAM      Vital Signs Reviewed.      General:  WDWN, Alert, NAD.      HEENT: PERRL, EOMI.  OP, nares clear, no sinus tenderness.      Neck: Supple, no JVD, no thyromegaly.      Lymph: No axillary, cervical, supraclavicular lymphadenopathy noted bilaterally.      Chest: Good aeration, clear to auscultation bilaterally, tympanic to percussion     bilaterally, no work of breathing noted.      CV: RRR, no MGR, pulses 2+, equal.        Abd: Soft, NT, ND, +BS, no HSM.      EXT: No clubbing, no cyanosis, no edema, no joint tenderness.        Neuro:  A  Skin: No rashes or lesions.      Vtials      Vitals:             Height 6 ft 1 in / 185.42 cm           Weight 217 lbs 4 oz / 98.266742 kg           BSA 2.23 m2           BMI 28.7 kg/m2           Temperature 98.2 F / 36.78 C - Tympanic           Pulse 78           Respirations 16           Blood Pressure 150/94 Sitting, Left Arm           Pulse Oximetry 97%, room air            REVIEW      Results Reviewed      PCCS Results Reviewed?:  Yes Prev Lab Results, Yes Prev Radiology Results, Yes     [facility]      Lab Results      I personally reviewed office notes from Dr. Rdz. I personally reviewed low     dose CT scan for lung cancer screening done in 2020. Labs show no PE and no     evidence of hypercapnic respiratory failure.      Radiographic Results               [facility]                PACS RADIOLOGY REPORT            Patient: ELIAS PARNELL   Acct: #[id number]   Report: #[id number]            UNIT #: [id number]    DOS: 10/26/20 1314      INSURANCE:[facility] NETWORK - O   ORDER #:[id number]      LOCATION:[facility]     : 1960            PROVIDERS      ADMITTING:     ATTENDING: MARII ALDANA      FAMILY:  ROBSON RDZ   ORDERING:  MARII ALDANA         OTHER:    DICTATING:  SHARIFA PACHECO MD            REQ #:[id number]   [facility]      REASON FOR EXAM:   ABN FINDINGS IN LUNG FIELD      REASON FOR VISIT:  MULTIPLE LUNG NODULES            *******Signed******         PROCEDURE:   CT CHEST WITHOUT CONTRAST             COMPARISON:   [facility], CT, CT LOW DOSE CHEST SCREENING,     1/14/2020, 11:32.             INDICATIONS:   ABNORMAL FINDINGS IN LUNG FIELD, NO COMPLAINTS             TECHNIQUE:   CT images were created without the administration of contrast     material.               PROTOCOL:     Standard imaging protocol performed                RADIATION:     DLP: 537.3mGy*cm          Automated exposure control was utilized to minimize radiation dose.              FINDINGS:         6 mm nodule right lower lobe (image 64) is not clearly seen on the previous     study.  Previously       described 5-6 mm nodule in the right middle lobe (image 74) is stable.  There     are other sub cm       pulmonary nodules that are also stable.  No adenopathy in the chest.  No acute     findings in the       included upper abdomen.  No aggressive appearing bone change.             CONCLUSION:   New 6 mm nodule right lower lobe.             Other sub cm pulmonary nodules are stable.             Emphysema.             Recommend attention on 6 month follow-up chest CT.              SHARIFA PACHECO MD             Electronically Signed and Approved By: SHARIFA PACHECO MD on 10/26/2020 at 13:47                               Until signed, this is an unconfirmed preliminary report that may contain      errors and is subject to change.                                              JUNIE:      D:10/26/20 1347            Assessment      Multiple lung nodules on CT - R91.8            Tobacco abuse, in remission - F17.201            Emphysema lung         Centrilobular emphysema - J43.2         Emphysema type: centrilobular            Notes      New Medications      * Lisinopril* Unknown Strength TABLET: PO QDAY #15      * MULTIVITAMINS (Multi-Vitamin) 1 EACH TABLET: 1 TAB PO QDAY #30       New Diagnostics      * Chest W/O Cont CT, As Soon As Possible         Dx: Multiple lung nodules on CT - R91.8      New Office Procedures      * Flu Vacc Fluarix Quadrivalent, Stat         Flu Vacc (6Mos Up)/Pf (Fluarix Quadrivalent Syringe) 60 MCG/0.5 ML SYRINGE:        60 MICROGRAM INTRAMUSCULARLY Qty 1 SYRINGE         Dx: Multiple lung nodules on CT - R91.8      IMPRESSION:      1.  Multiple lung nodules, largest is 6 mm in right middle lobe, noncalcified.      Will need to be followed per Fleischner criteria in a high risk patient.      2.  Intermittent dyspnea.      3. Emphysema with no evidence of COPD. Essentially asymptomatic at baseline.      4. Tobacco abuse with cigarettes in remission.               PLAN:      1.  Per Fleischner criteria, repeat noncontrast chest CT now.  If nodules     unchanged in size, repeat in one year and follow up for 24 months of stability.     If nodule is increasing in size, then we will arrange for likely transthoracic     needle biopsy versus other diagnostic intervention.      2. No indication to workup emphysema given minimal symptoms.  No indication for     inhaler at this time.      3. We will give him flu vaccination today. There is no indication for Prevnar or    Pneumovax.      4. Follow up with us in one year pending CAT scan results.            Electronically signed by MARII ALDANA  10/28/2020 13:12       Disclaimer: Converted document may not contain table formatting or lab diagrams. Please see [employer] for the authenticated document.

## 2021-07-01 VITALS
SYSTOLIC BLOOD PRESSURE: 145 MMHG | TEMPERATURE: 97.8 F | HEIGHT: 74 IN | DIASTOLIC BLOOD PRESSURE: 93 MMHG | HEART RATE: 93 BPM | BODY MASS INDEX: 30.36 KG/M2 | WEIGHT: 236.6 LBS

## 2021-07-01 VITALS
HEIGHT: 74 IN | SYSTOLIC BLOOD PRESSURE: 125 MMHG | BODY MASS INDEX: 28.16 KG/M2 | HEART RATE: 71 BPM | WEIGHT: 219.4 LBS | DIASTOLIC BLOOD PRESSURE: 83 MMHG | TEMPERATURE: 97.7 F

## 2021-07-01 VITALS
BODY MASS INDEX: 29.77 KG/M2 | DIASTOLIC BLOOD PRESSURE: 82 MMHG | TEMPERATURE: 98 F | SYSTOLIC BLOOD PRESSURE: 126 MMHG | HEIGHT: 74 IN | WEIGHT: 232 LBS | HEART RATE: 91 BPM

## 2021-07-02 VITALS
SYSTOLIC BLOOD PRESSURE: 136 MMHG | WEIGHT: 213 LBS | BODY MASS INDEX: 27.34 KG/M2 | DIASTOLIC BLOOD PRESSURE: 85 MMHG | HEIGHT: 74 IN | HEART RATE: 79 BPM | TEMPERATURE: 97.5 F

## 2021-09-19 DIAGNOSIS — Z12.5 SCREENING FOR PROSTATE CANCER: ICD-10-CM

## 2021-09-19 DIAGNOSIS — R73.9 HYPERGLYCEMIA: ICD-10-CM

## 2021-09-19 DIAGNOSIS — I10 ESSENTIAL HYPERTENSION: Primary | ICD-10-CM

## 2021-09-19 DIAGNOSIS — Z11.59 NEED FOR HEPATITIS C SCREENING TEST: ICD-10-CM

## 2021-09-19 NOTE — PROGRESS NOTES
Please let Simin know that I have reviewed his chart.  I would recommend he have some follow-up testing including PSA at his convenience.  These labs do not need to be fasting.  Thanks.

## 2021-10-21 ENCOUNTER — LAB (OUTPATIENT)
Dept: LAB | Facility: HOSPITAL | Age: 61
End: 2021-10-21

## 2021-10-21 ENCOUNTER — OFFICE VISIT (OUTPATIENT)
Dept: PULMONOLOGY | Facility: CLINIC | Age: 61
End: 2021-10-21

## 2021-10-21 VITALS
TEMPERATURE: 98 F | WEIGHT: 230 LBS | SYSTOLIC BLOOD PRESSURE: 132 MMHG | OXYGEN SATURATION: 95 % | RESPIRATION RATE: 17 BRPM | DIASTOLIC BLOOD PRESSURE: 92 MMHG | HEART RATE: 74 BPM | BODY MASS INDEX: 31.15 KG/M2 | HEIGHT: 72 IN

## 2021-10-21 DIAGNOSIS — Z11.59 NEED FOR HEPATITIS C SCREENING TEST: ICD-10-CM

## 2021-10-21 DIAGNOSIS — I10 ESSENTIAL HYPERTENSION: ICD-10-CM

## 2021-10-21 DIAGNOSIS — R91.8 LUNG NODULE, MULTIPLE: Primary | ICD-10-CM

## 2021-10-21 DIAGNOSIS — Z12.5 SCREENING FOR PROSTATE CANCER: ICD-10-CM

## 2021-10-21 DIAGNOSIS — F17.201 TOBACCO ABUSE, IN REMISSION: ICD-10-CM

## 2021-10-21 DIAGNOSIS — R73.9 HYPERGLYCEMIA: ICD-10-CM

## 2021-10-21 LAB
ANION GAP SERPL CALCULATED.3IONS-SCNC: 8.8 MMOL/L (ref 5–15)
BUN SERPL-MCNC: 21 MG/DL (ref 8–23)
BUN/CREAT SERPL: 20.2 (ref 7–25)
CALCIUM SPEC-SCNC: 9.2 MG/DL (ref 8.6–10.5)
CHLORIDE SERPL-SCNC: 103 MMOL/L (ref 98–107)
CO2 SERPL-SCNC: 26.2 MMOL/L (ref 22–29)
CREAT SERPL-MCNC: 1.04 MG/DL (ref 0.76–1.27)
GFR SERPL CREATININE-BSD FRML MDRD: 72 ML/MIN/1.73
GLUCOSE SERPL-MCNC: 107 MG/DL (ref 65–99)
HBA1C MFR BLD: 5.8 % (ref 4.8–5.6)
HCV AB SER DONR QL: NORMAL
POTASSIUM SERPL-SCNC: 4 MMOL/L (ref 3.5–5.2)
PSA SERPL-MCNC: 0.64 NG/ML (ref 0–4)
SODIUM SERPL-SCNC: 138 MMOL/L (ref 136–145)

## 2021-10-21 PROCEDURE — 99214 OFFICE O/P EST MOD 30 MIN: CPT | Performed by: INTERNAL MEDICINE

## 2021-10-21 PROCEDURE — 83036 HEMOGLOBIN GLYCOSYLATED A1C: CPT

## 2021-10-21 PROCEDURE — G0103 PSA SCREENING: HCPCS

## 2021-10-21 PROCEDURE — 36415 COLL VENOUS BLD VENIPUNCTURE: CPT

## 2021-10-21 PROCEDURE — 86803 HEPATITIS C AB TEST: CPT

## 2021-10-21 PROCEDURE — 80048 BASIC METABOLIC PNL TOTAL CA: CPT

## 2021-10-21 RX ORDER — LISINOPRIL AND HYDROCHLOROTHIAZIDE 20; 12.5 MG/1; MG/1
1 TABLET ORAL DAILY
COMMUNITY
Start: 2021-10-12 | End: 2021-12-29 | Stop reason: SDUPTHER

## 2021-10-21 RX ORDER — ROSUVASTATIN CALCIUM 10 MG/1
10 TABLET, COATED ORAL DAILY
COMMUNITY
Start: 2021-09-28 | End: 2021-12-28

## 2021-10-21 NOTE — PROGRESS NOTES
Primary Care Provider  Ward Rdz MD   Referring Provider  No ref. provider found      Patient Complaint  Follow-up (lung nodule) and Shortness of Breath      Subjective          Ry Santo presents to [facility]      History of Presenting Illness  Ry Santo is a 61 y.o. male here for follow-up for multiple lung nodules.  Since last office visit he had a 1 year follow-up noncontrast chest CT showing multiple stable lung nodules stable in size x1 year he will be due for 2-year follow-up next May.  He is a former cigarette smoker and has not had a cigarette in 16 years.  He works full-time in construction and is otherwise been doing well.  He denies dyspnea, coughing, wheezing, headaches, chest pain, weight loss or hemoptysis. Denies fevers, chills and night sweats.  He is able to perform ADLs without difficulties and denies any swollen glands/lymph nodes in the head or neck.    I have personally reviewed the review of systems, past family, social, medical and surgical histories; and agree with their findings.      Review of Systems  Constitutional symptoms:  Denied complaints   Ear, nose, throat: Denied complaints  Cardiovascular:  Denied complaints  Respiratory: Denied complaints  Gastrointestinal: Denied complaints  Musculoskeletal: Denied complaints        History reviewed. No pertinent family history.     Social History     Socioeconomic History   • Marital status: Single   Tobacco Use   • Smoking status: Former Smoker     Quit date: 10/21/2005     Years since quittin.0   • Smokeless tobacco: Never Used        History reviewed. No pertinent past medical history.     Immunization History   Administered Date(s) Administered   • COVID-19 (PFIZER) 2021, 2021   • FluLaval/Fluarix/Fluzone >6 10/26/2020         Allergies   Allergen Reactions   • Aspirin Itching   • Penicillins Hives          Current Outpatient Medications:   •   "lisinopril-hydrochlorothiazide (PRINZIDE,ZESTORETIC) 20-12.5 MG per tablet, Take 1 tablet by mouth Daily., Disp: , Rfl:   •  rosuvastatin (CRESTOR) 10 MG tablet, Take 10 mg by mouth Daily., Disp: , Rfl:          Objective     Vital Signs:   /92 (BP Location: Left arm, Patient Position: Sitting)   Pulse 74   Temp 98 °F (36.7 °C) (Infrared)   Resp 17   Ht 182.9 cm (72\")   Wt 104 kg (230 lb)   SpO2 95%   BMI 31.19 kg/m²     Physical Exam  Vital Signs Reviewed   WDWN, Alert, NAD.    HEENT:  PERRL, EOMI.  OP, nares clear  Neck:  Supple, no JVD, no thyromegaly  Chest:  good aeration, clear to auscultation bilaterally, tympanic to percussion bilaterally, no work of breathing noted  CV: RRR, no MGR, pulses 2+, equal.  Abd:  Soft, NT, ND, + BS, no HSM, protuberant abdomen  EXT:  no clubbing, no cyanosis, no edema  Neuro:  A&Ox3, CN grossly intact, no focal deficits.  Skin: No rashes or lesions noted       Result Review :   I have personally reviewed chest CT from February 2021 showing multiple stable lung nodules.  Also does have some emphysematous changes.  Labs from 2021 were also personally reviewed showing no peripheral eosinophilia and no evidence of chronic hypercapnic respiratory failure         Assessment and Plan      Patient Active Problem List   Diagnosis   • Tobacco abuse, in remission   • Lung nodule, multiple       Impression:  Multiple lung nodules, largest is 6 mm in right middle lobe, noncalcified.  Stable in size x12 months in this high-risk patient   Emphysema with no evidence of COPD.  Patient is asymptomatic  Tobacco abuse with cigarettes in remission.      Obesity with BMI 31.1           Plan:  Per Fleischner criteria, repeat noncontrast chest CT in February 2022 to complete 24-month surveillance.  After that, no follow-up is needed.  Is outside of window for lung cancer screening as he quit smoking over 15 years ago   Emphysema is asymptomatic.  No further work-up or treatment needed at " this time  Smoking status: Former smoker.  Quit in 2005.  Not a candidate for lung cancer screening  Vaccination status: Declined flu vaccination today.  No indication for Prevnar or Pneumovax.  Up-to-date with COVID-19 vaccination x2  Medications personally reviewed    Follow Up   Return if symptoms worsen or fail to improve.  Patient was given instructions and counseling regarding his condition or for health maintenance advice. Please see specific information pulled into the AVS if appropriate.     Electronically signed by Casper Crystal MD, 10/21/21, 4:05 PM EDT.

## 2021-12-28 DIAGNOSIS — E78.2 MIXED HYPERLIPIDEMIA: ICD-10-CM

## 2021-12-29 RX ORDER — LISINOPRIL AND HYDROCHLOROTHIAZIDE 20; 12.5 MG/1; MG/1
1 TABLET ORAL DAILY
Qty: 90 TABLET | Refills: 0 | Status: SHIPPED | OUTPATIENT
Start: 2021-12-29 | End: 2022-01-11

## 2021-12-29 RX ORDER — ROSUVASTATIN CALCIUM 10 MG/1
10 TABLET, COATED ORAL DAILY
Qty: 90 TABLET | Refills: 0 | Status: SHIPPED | OUTPATIENT
Start: 2021-12-29 | End: 2022-01-25 | Stop reason: SDUPTHER

## 2021-12-29 NOTE — TELEPHONE ENCOUNTER
I sent a single refill.  Please call patient and schedule follow-up.  He is due for wellness check in January.  Thanks.

## 2022-01-11 DIAGNOSIS — I10 ESSENTIAL (PRIMARY) HYPERTENSION: ICD-10-CM

## 2022-01-11 RX ORDER — LISINOPRIL AND HYDROCHLOROTHIAZIDE 20; 12.5 MG/1; MG/1
1 TABLET ORAL DAILY
Qty: 90 TABLET | Refills: 0 | Status: SHIPPED | OUTPATIENT
Start: 2022-01-11 | End: 2022-01-25 | Stop reason: SDUPTHER

## 2022-01-25 ENCOUNTER — OFFICE VISIT (OUTPATIENT)
Dept: FAMILY MEDICINE CLINIC | Age: 62
End: 2022-01-25

## 2022-01-25 VITALS
WEIGHT: 236 LBS | TEMPERATURE: 97.6 F | HEART RATE: 80 BPM | HEIGHT: 72 IN | BODY MASS INDEX: 31.97 KG/M2 | DIASTOLIC BLOOD PRESSURE: 89 MMHG | SYSTOLIC BLOOD PRESSURE: 136 MMHG

## 2022-01-25 DIAGNOSIS — Z00.00 PHYSICAL EXAM: Primary | ICD-10-CM

## 2022-01-25 DIAGNOSIS — I10 ESSENTIAL (PRIMARY) HYPERTENSION: ICD-10-CM

## 2022-01-25 DIAGNOSIS — E78.2 MIXED HYPERLIPIDEMIA: ICD-10-CM

## 2022-01-25 PROCEDURE — 99396 PREV VISIT EST AGE 40-64: CPT | Performed by: FAMILY MEDICINE

## 2022-01-25 RX ORDER — ROSUVASTATIN CALCIUM 10 MG/1
10 TABLET, COATED ORAL DAILY
Qty: 90 TABLET | Refills: 3 | Status: SHIPPED | OUTPATIENT
Start: 2022-01-25 | End: 2023-02-03 | Stop reason: SDUPTHER

## 2022-01-25 RX ORDER — LISINOPRIL AND HYDROCHLOROTHIAZIDE 20; 12.5 MG/1; MG/1
1 TABLET ORAL DAILY
Qty: 90 TABLET | Refills: 3 | Status: SHIPPED | OUTPATIENT
Start: 2022-01-25 | End: 2023-01-30

## 2022-01-25 NOTE — PROGRESS NOTES
Ry Santo presents to [facility].    Chief Complaint:  Physical exam, blood pressure, cholesterol    Subjective       History of Present Illness:  Simin is in today for wellness exam.  He is 61 years old and works for [employer] where he has been since the year .  He does not smoke after stopping in .  He estimates he will drink about 30 beers weekly especially in the summer.  He does not drink as much in the winter.  He is up-to-date for now on colonoscopy.  He is due for prostate check.  He had CT of the chest done last year.    Simin also has a history of hypertension and elevated cholesterol for which he remains on treatment.  He does not routinely check his blood pressure.  He did have blood work done about 3 months ago that looked good.      Review of Systems:  Review of Systems   Constitutional: Negative for chills and fever.   Respiratory: Negative for cough and shortness of breath.    Cardiovascular: Negative for chest pain and palpitations.   Gastrointestinal: Negative for abdominal pain, nausea and vomiting.        Objective   Medical History:  No past medical history on file.  No past surgical history on file.   History reviewed. No pertinent family history.  Social History     Tobacco Use   • Smoking status: Former Smoker     Quit date: 10/21/2005     Years since quittin.2   • Smokeless tobacco: Never Used   Substance Use Topics   • Alcohol use: Not on file       Health Maintenance Due   Topic Date Due   • TDAP/TD VACCINES (1 - Tdap) Never done   • ZOSTER VACCINE (1 of 2) Never done        Immunization History   Administered Date(s) Administered   • COVID-19 (PFIZER) PURPLE CAP 2021, 2021   • FluLaval/Fluarix/Fluzone >6 10/26/2020       Allergies   Allergen Reactions   • Aspirin Itching   • Penicillins Hives        Medications:  Current Outpatient Medications on File Prior to Visit   Medication Sig   • [DISCONTINUED]  "lisinopril-hydrochlorothiazide (PRINZIDE,ZESTORETIC) 20-12.5 MG per tablet Take 1 tablet by mouth Daily.   • [DISCONTINUED] rosuvastatin (CRESTOR) 10 MG tablet Take 1 tablet by mouth Daily.     No current facility-administered medications on file prior to visit.       Vital Signs:   /92 (BP Location: Right arm, Patient Position: Sitting)   Pulse 86   Temp 97.6 °F (36.4 °C) (Oral)   Ht 182.9 cm (72.01\")   Wt 107 kg (236 lb)   BMI 32.00 kg/m²       Physical Exam:  Physical Exam  Vitals and nursing note reviewed. Chaperone present: patient declined chaperone.   Constitutional:       General: He is not in acute distress.     Appearance: He is not ill-appearing.   HENT:      Right Ear: Tympanic membrane and ear canal normal.      Left Ear: Tympanic membrane and ear canal normal.      Mouth/Throat:      Mouth: Mucous membranes are moist.      Comments: Pharynx appears normal  Eyes:      Extraocular Movements: Extraocular movements intact.      Pupils: Pupils are equal, round, and reactive to light.   Neck:      Thyroid: No thyromegaly.   Cardiovascular:      Rate and Rhythm: Normal rate and regular rhythm.      Heart sounds: No murmur heard.      Pulmonary:      Effort: Pulmonary effort is normal.      Breath sounds: Normal breath sounds.   Abdominal:      General: There is no distension.      Palpations: Abdomen is soft. There is no mass.      Tenderness: There is no abdominal tenderness.   Genitourinary:     Prostate: Not enlarged and no nodules present.      Rectum: Guaiac result negative. No mass.   Musculoskeletal:      Cervical back: Normal range of motion.   Skin:     Findings: No lesion or rash.   Neurological:      General: No focal deficit present.      Mental Status: He is oriented to person, place, and time.      Cranial Nerves: No cranial nerve deficit.   Psychiatric:         Mood and Affect: Mood normal.         Result Review      The following data was reviewed by Ward Rdz MD on " 01/25/2022.  Lab Results   Component Value Date    WBC 6.43 04/02/2021    HGB 15.30 04/02/2021    HCT 44.9 04/02/2021    MCV 90.9 04/02/2021    .00 04/02/2021     Lab Results   Component Value Date    GLUCOSE 107 (H) 10/21/2021    BUN 21 10/21/2021    CREATININE 1.04 10/21/2021     10/21/2021    K 4.0 10/21/2021     10/21/2021    CO2 26.2 10/21/2021    CALCIUM 9.2 10/21/2021    PROTEINTOT 6.9 04/02/2021    ALBUMIN 4.4 04/02/2021    ALT 32 04/02/2021    AST 25 04/02/2021    ALKPHOS 80 04/02/2021    BILITOT 0.84 04/02/2021    EGFRIFNONA 72 10/21/2021    GLOB 2.5 04/02/2021    BCR 20.2 10/21/2021    ANIONGAP 8.8 10/21/2021      Lab Results   Component Value Date    CHLPL 143 04/02/2021    TRIG 62 04/02/2021    HDL 67 (H) 04/02/2021    LDL 64 (L) 04/02/2021     Lab Results   Component Value Date    TSH 0.794 04/02/2021     Lab Results   Component Value Date    HGBA1C 5.80 (H) 10/21/2021     Lab Results   Component Value Date    PSA 0.639 10/21/2021    PSA 1.08 07/07/2020    PSA 0.80 03/25/2019            Assessment and Plan:   Today, we have reviewed his care.  Simin seems to be in good shape overall.  We will refill his medication as noted below.  We will reach out to him in May about colon cancer screening.  Otherwise, no short-term changes anticipated.  We will plan to see him back in about a year.       Diagnoses and all orders for this visit:    1. Physical exam (Primary)    2. Essential (primary) hypertension  -     lisinopril-hydrochlorothiazide (PRINZIDE,ZESTORETIC) 20-12.5 MG per tablet; Take 1 tablet by mouth Daily.  Dispense: 90 tablet; Refill: 3    3. Mixed hyperlipidemia  -     rosuvastatin (CRESTOR) 10 MG tablet; Take 1 tablet by mouth Daily.  Dispense: 90 tablet; Refill: 3          Follow Up   Return in about 1 year (around 1/25/2023).  Patient was given instructions and counseling regarding his condition or for health maintenance advice. Please see specific information pulled into the  AVS if appropriate.

## 2022-04-26 ENCOUNTER — APPOINTMENT (OUTPATIENT)
Dept: CT IMAGING | Facility: HOSPITAL | Age: 62
End: 2022-04-26

## 2022-04-28 ENCOUNTER — HOSPITAL ENCOUNTER (OUTPATIENT)
Dept: CT IMAGING | Facility: HOSPITAL | Age: 62
Discharge: HOME | End: 2022-04-28
Admitting: INTERNAL MEDICINE

## 2022-04-28 DIAGNOSIS — R91.8 LUNG NODULE, MULTIPLE: ICD-10-CM

## 2022-04-28 PROCEDURE — 71250 CT THORAX DX C-: CPT

## 2022-10-11 ENCOUNTER — TRANSCRIBE ORDERS (OUTPATIENT)
Dept: ADMINISTRATIVE | Facility: HOSPITAL | Age: 62
End: 2022-10-11

## 2022-10-11 DIAGNOSIS — I71.019 DISSECTING AORTIC ANEURYSM (ANY PART), THORACIC: Primary | ICD-10-CM

## 2022-11-01 ENCOUNTER — APPOINTMENT (OUTPATIENT)
Dept: CT IMAGING | Facility: HOSPITAL | Age: 62
End: 2022-11-01

## 2022-12-06 ENCOUNTER — TRANSCRIBE ORDERS (OUTPATIENT)
Dept: ADMINISTRATIVE | Facility: HOSPITAL | Age: 62
End: 2022-12-06

## 2022-12-06 ENCOUNTER — HOSPITAL ENCOUNTER (OUTPATIENT)
Dept: GENERAL RADIOLOGY | Facility: HOSPITAL | Age: 62
Discharge: HOME | End: 2022-12-06
Admitting: NURSE PRACTITIONER

## 2022-12-06 DIAGNOSIS — R10.9 FLANK PAIN: ICD-10-CM

## 2022-12-06 DIAGNOSIS — M54.50 PAIN IN LEFT LUMBAR REGION OF BACK: ICD-10-CM

## 2022-12-06 DIAGNOSIS — M54.50 PAIN IN LEFT LUMBAR REGION OF BACK: Primary | ICD-10-CM

## 2022-12-06 PROCEDURE — 74018 RADEX ABDOMEN 1 VIEW: CPT

## 2022-12-06 PROCEDURE — 72110 X-RAY EXAM L-2 SPINE 4/>VWS: CPT

## 2023-01-30 DIAGNOSIS — I10 ESSENTIAL (PRIMARY) HYPERTENSION: ICD-10-CM

## 2023-01-30 RX ORDER — LISINOPRIL AND HYDROCHLOROTHIAZIDE 20; 12.5 MG/1; MG/1
1 TABLET ORAL DAILY
Qty: 90 TABLET | Refills: 0 | Status: SHIPPED | OUTPATIENT
Start: 2023-01-30 | End: 2023-02-03 | Stop reason: SDUPTHER

## 2023-02-03 ENCOUNTER — OFFICE VISIT (OUTPATIENT)
Dept: FAMILY MEDICINE CLINIC | Age: 63
End: 2023-02-03
Payer: COMMERCIAL

## 2023-02-03 VITALS
HEART RATE: 86 BPM | DIASTOLIC BLOOD PRESSURE: 82 MMHG | HEIGHT: 72 IN | BODY MASS INDEX: 32.07 KG/M2 | SYSTOLIC BLOOD PRESSURE: 127 MMHG | TEMPERATURE: 98 F | WEIGHT: 236.8 LBS

## 2023-02-03 DIAGNOSIS — Z12.11 SCREEN FOR COLON CANCER: ICD-10-CM

## 2023-02-03 DIAGNOSIS — R73.9 HYPERGLYCEMIA: ICD-10-CM

## 2023-02-03 DIAGNOSIS — Z00.00 PHYSICAL EXAM: Primary | ICD-10-CM

## 2023-02-03 DIAGNOSIS — Z12.5 SCREENING FOR PROSTATE CANCER: ICD-10-CM

## 2023-02-03 DIAGNOSIS — E78.2 MIXED HYPERLIPIDEMIA: ICD-10-CM

## 2023-02-03 DIAGNOSIS — I10 ESSENTIAL (PRIMARY) HYPERTENSION: ICD-10-CM

## 2023-02-03 DIAGNOSIS — K62.89 RECTAL MASS: ICD-10-CM

## 2023-02-03 PROCEDURE — 99396 PREV VISIT EST AGE 40-64: CPT | Performed by: FAMILY MEDICINE

## 2023-02-03 RX ORDER — ROSUVASTATIN CALCIUM 10 MG/1
10 TABLET, COATED ORAL DAILY
Qty: 90 TABLET | Refills: 3 | Status: SHIPPED | OUTPATIENT
Start: 2023-02-03

## 2023-02-03 RX ORDER — LISINOPRIL AND HYDROCHLOROTHIAZIDE 20; 12.5 MG/1; MG/1
1 TABLET ORAL DAILY
Qty: 90 TABLET | Refills: 3 | Status: SHIPPED | OUTPATIENT
Start: 2023-02-03

## 2023-02-03 NOTE — PROGRESS NOTES
Ry Santo presents to [facility].    Chief Complaint:  Physical exam, blood pressure, cholesterol    Subjective       History of Present Illness:  Simin is in today for physical exam.  He is 62 years old and is working for [employer] right now.  He does not smoke after stopping in roughly .  He tends to drink more beer in the summer, but he is not drinking much right now.  He is due for some type of colon cancer screening.  He is due for prostate check.    Simin also has hypertension and elevated cholesterol.  He remains on treatment for these issues.    Review of Systems:  Review of Systems   Constitutional: Negative for chills and fever.   Respiratory: Negative for cough and shortness of breath.    Cardiovascular: Negative for chest pain and palpitations.   Gastrointestinal: Negative for abdominal pain, nausea and vomiting.        Objective   Medical History:  Past Medical History:   • Essential hypertension   • Mixed hyperlipidemia     Past Surgical History:   • COLONOSCOPY    Diverticulosis   • ENDOSCOPY    Bile reflux, gastritis   • FINGER SURGERY      Family History   Problem Relation Age of Onset   • Diabetes type II Mother    • Aortic aneurysm Father    • Coronary artery disease Father    • Stroke Father      Social History     Tobacco Use   • Smoking status: Former     Types: Cigarettes     Quit date: 10/21/2005     Years since quittin.2   • Smokeless tobacco: Never   Substance Use Topics   • Alcohol use: Yes     Comment: 'about 30 beers weekly'       Health Maintenance Due   Topic Date Due   • Pneumococcal Vaccine 0-64 (1 - PCV) Never done   • TDAP/TD VACCINES (1 - Tdap) Never done   • ZOSTER VACCINE (1 of 2) Never done   • LIPID PANEL  2022   • COLORECTAL CANCER SCREENING  2022        Immunization History   Administered Date(s) Administered   • COVID-19 (PFIZER) PURPLE CAP 2021, 2021   • FluLaval/Fluzone >6mos 10/26/2020  "      Allergies   Allergen Reactions   • Aspirin Itching   • Penicillins Hives        Medications:  Current Outpatient Medications on File Prior to Visit   Medication Sig   • [DISCONTINUED] lisinopril-hydrochlorothiazide (PRINZIDE,ZESTORETIC) 20-12.5 MG per tablet Take 1 tablet by mouth Daily.   • [DISCONTINUED] rosuvastatin (CRESTOR) 10 MG tablet Take 1 tablet by mouth Daily.     No current facility-administered medications on file prior to visit.       Vital Signs:   /82 (BP Location: Right arm, Patient Position: Sitting)   Pulse 86   Temp 98 °F (36.7 °C) (Oral)   Ht 182.9 cm (72.01\")   Wt 107 kg (236 lb 12.8 oz)   BMI 32.11 kg/m²       Physical Exam:  Physical Exam  Vitals and nursing note reviewed. Chaperone present: patient declined chaperone.   Constitutional:       General: He is not in acute distress.     Appearance: He is not ill-appearing.   HENT:      Right Ear: Tympanic membrane and ear canal normal.      Left Ear: Tympanic membrane and ear canal normal.      Mouth/Throat:      Mouth: Mucous membranes are moist.      Comments: Pharynx appears normal  Eyes:      Extraocular Movements: Extraocular movements intact.      Pupils: Pupils are equal, round, and reactive to light.   Neck:      Thyroid: No thyromegaly.   Cardiovascular:      Rate and Rhythm: Normal rate and regular rhythm.      Heart sounds: No murmur heard.  Pulmonary:      Effort: Pulmonary effort is normal.      Breath sounds: Normal breath sounds.   Abdominal:      General: There is no distension.      Palpations: Abdomen is soft. There is no mass.      Tenderness: There is no abdominal tenderness.   Genitourinary:     Prostate: Not enlarged and no nodules present.      Rectum: Guaiac result negative. Mass (There is an area of thickness noted at the superior portion of the rectum just inside the anus.) present.   Musculoskeletal:      Cervical back: Normal range of motion.   Skin:     Findings: No lesion or rash.   Neurological:    "   General: No focal deficit present.      Mental Status: He is oriented to person, place, and time.      Cranial Nerves: No cranial nerve deficit.   Psychiatric:         Mood and Affect: Mood normal.         Result Review      The following data was reviewed by Ward Rdz MD on 02/03/2023.  Lab Results   Component Value Date    WBC 6.43 04/02/2021    HGB 15.30 04/02/2021    HCT 44.9 04/02/2021    MCV 90.9 04/02/2021    .00 04/02/2021     Lab Results   Component Value Date    GLUCOSE 107 (H) 10/21/2021    BUN 21 10/21/2021    CREATININE 1.04 10/21/2021     10/21/2021    K 4.0 10/21/2021     10/21/2021    CO2 26.2 10/21/2021    CALCIUM 9.2 10/21/2021    PROTEINTOT 6.9 04/02/2021    ALBUMIN 4.4 04/02/2021    ALT 32 04/02/2021    AST 25 04/02/2021    ALKPHOS 80 04/02/2021    BILITOT 0.84 04/02/2021    EGFRIFNONA 72 10/21/2021    GLOB 2.5 04/02/2021    BCR 20.2 10/21/2021    ANIONGAP 8.8 10/21/2021      Lab Results   Component Value Date    CHLPL 143 04/02/2021    TRIG 62 04/02/2021    HDL 67 (H) 04/02/2021    LDL 64 (L) 04/02/2021     Lab Results   Component Value Date    TSH 0.794 04/02/2021     Lab Results   Component Value Date    HGBA1C 5.80 (H) 10/21/2021     Lab Results   Component Value Date    PSA 0.639 10/21/2021    PSA 1.08 07/07/2020    PSA 0.80 03/25/2019            Assessment and Plan:   Today, we have reviewed his care.  Simin continues to be in good health overall.  We will go ahead and refill needed medications for him and update labs.  He will stop back in for those at his convenience.  His prostate was normal on exam, but he may have an area of thickening of the tissue noted at the superior aspect of the anus or just inside the rectum.  He is due for colon cancer screening anyway.  We will refer him back to Dr. Schultz for evaluation.       Diagnoses and all orders for this visit:    1. Physical exam (Primary)  -     CBC (No Diff); Future  -     Comprehensive Metabolic  Panel; Future  -     Lipid Panel; Future  -     TSH; Future  -     PSA Screen; Future  -     Urinalysis With Microscopic - Urine, Clean Catch; Future    2. Essential (primary) hypertension  -     lisinopril-hydrochlorothiazide (PRINZIDE,ZESTORETIC) 20-12.5 MG per tablet; Take 1 tablet by mouth Daily.  Dispense: 90 tablet; Refill: 3  -     Comprehensive Metabolic Panel; Future    3. Mixed hyperlipidemia  -     rosuvastatin (CRESTOR) 10 MG tablet; Take 1 tablet by mouth Daily.  Dispense: 90 tablet; Refill: 3  -     Comprehensive Metabolic Panel; Future  -     Lipid Panel; Future  -     TSH; Future    4. Screening for prostate cancer  -     PSA Screen; Future    5. Rectal mass  -     Ambulatory Referral to General Surgery    6. Screen for colon cancer  -     Ambulatory Referral to General Surgery      Follow Up   Return in about 1 year (around 2/3/2024) for Recheck, Annual physical.  Patient was given instructions and counseling regarding his condition or for health maintenance advice. Please see specific information pulled into the AVS if appropriate.

## 2023-02-13 ENCOUNTER — TELEPHONE (OUTPATIENT)
Dept: FAMILY MEDICINE CLINIC | Age: 63
End: 2023-02-13
Payer: COMMERCIAL

## 2023-02-14 ENCOUNTER — LAB (OUTPATIENT)
Dept: LAB | Facility: HOSPITAL | Age: 63
End: 2023-02-14
Payer: COMMERCIAL

## 2023-02-14 DIAGNOSIS — I10 ESSENTIAL (PRIMARY) HYPERTENSION: ICD-10-CM

## 2023-02-14 DIAGNOSIS — Z00.00 PHYSICAL EXAM: ICD-10-CM

## 2023-02-14 DIAGNOSIS — E78.2 MIXED HYPERLIPIDEMIA: ICD-10-CM

## 2023-02-14 DIAGNOSIS — Z12.5 SCREENING FOR PROSTATE CANCER: ICD-10-CM

## 2023-02-14 LAB
ALBUMIN SERPL-MCNC: 4.8 G/DL (ref 3.5–5.2)
ALBUMIN/GLOB SERPL: 2.2 G/DL
ALP SERPL-CCNC: 69 U/L (ref 39–117)
ALT SERPL W P-5'-P-CCNC: 29 U/L (ref 1–41)
ANION GAP SERPL CALCULATED.3IONS-SCNC: 13.2 MMOL/L (ref 5–15)
AST SERPL-CCNC: 28 U/L (ref 1–40)
BACTERIA UR QL AUTO: NORMAL /HPF
BILIRUB SERPL-MCNC: 1.2 MG/DL (ref 0–1.2)
BILIRUB UR QL STRIP: NEGATIVE
BUN SERPL-MCNC: 14 MG/DL (ref 8–23)
BUN/CREAT SERPL: 14.9 (ref 7–25)
CALCIUM SPEC-SCNC: 9.5 MG/DL (ref 8.6–10.5)
CHLORIDE SERPL-SCNC: 102 MMOL/L (ref 98–107)
CHOLEST SERPL-MCNC: 94 MG/DL (ref 0–200)
CLARITY UR: CLEAR
CO2 SERPL-SCNC: 22.8 MMOL/L (ref 22–29)
COLOR UR: YELLOW
CREAT SERPL-MCNC: 0.94 MG/DL (ref 0.76–1.27)
DEPRECATED RDW RBC AUTO: 41.2 FL (ref 37–54)
EGFRCR SERPLBLD CKD-EPI 2021: 91.1 ML/MIN/1.73
ERYTHROCYTE [DISTWIDTH] IN BLOOD BY AUTOMATED COUNT: 12.4 % (ref 12.3–15.4)
GLOBULIN UR ELPH-MCNC: 2.2 GM/DL
GLUCOSE SERPL-MCNC: 109 MG/DL (ref 65–99)
GLUCOSE UR STRIP-MCNC: NEGATIVE MG/DL
HCT VFR BLD AUTO: 45.5 % (ref 37.5–51)
HDLC SERPL-MCNC: 50 MG/DL (ref 40–60)
HGB BLD-MCNC: 15.3 G/DL (ref 13–17.7)
HGB UR QL STRIP.AUTO: NEGATIVE
KETONES UR QL STRIP: NEGATIVE
LDLC SERPL CALC-MCNC: 33 MG/DL (ref 0–100)
LDLC/HDLC SERPL: 0.74 {RATIO}
LEUKOCYTE ESTERASE UR QL STRIP.AUTO: NEGATIVE
MCH RBC QN AUTO: 30.4 PG (ref 26.6–33)
MCHC RBC AUTO-ENTMCNC: 33.6 G/DL (ref 31.5–35.7)
MCV RBC AUTO: 90.3 FL (ref 79–97)
NITRITE UR QL STRIP: NEGATIVE
PH UR STRIP.AUTO: 5.5 [PH] (ref 5–8)
PLATELET # BLD AUTO: 238 10*3/MM3 (ref 140–450)
PMV BLD AUTO: 10.5 FL (ref 6–12)
POTASSIUM SERPL-SCNC: 4.6 MMOL/L (ref 3.5–5.2)
PROT SERPL-MCNC: 7 G/DL (ref 6–8.5)
PROT UR QL STRIP: NEGATIVE
PSA SERPL-MCNC: 0.62 NG/ML (ref 0–4)
RBC # BLD AUTO: 5.04 10*6/MM3 (ref 4.14–5.8)
RBC # UR STRIP: NORMAL /HPF
REF LAB TEST METHOD: NORMAL
SODIUM SERPL-SCNC: 138 MMOL/L (ref 136–145)
SP GR UR STRIP: 1.02 (ref 1–1.03)
SQUAMOUS #/AREA URNS HPF: NORMAL /HPF
TRIGL SERPL-MCNC: 36 MG/DL (ref 0–150)
TSH SERPL DL<=0.05 MIU/L-ACNC: 0.79 UIU/ML (ref 0.27–4.2)
UROBILINOGEN UR QL STRIP: NORMAL
VLDLC SERPL-MCNC: 11 MG/DL (ref 5–40)
WBC # UR STRIP: NORMAL /HPF
WBC NRBC COR # BLD: 6.48 10*3/MM3 (ref 3.4–10.8)

## 2023-02-14 PROCEDURE — G0103 PSA SCREENING: HCPCS

## 2023-02-14 PROCEDURE — 36415 COLL VENOUS BLD VENIPUNCTURE: CPT

## 2023-02-14 PROCEDURE — 80050 GENERAL HEALTH PANEL: CPT

## 2023-02-14 PROCEDURE — 80061 LIPID PANEL: CPT

## 2023-02-14 PROCEDURE — 83036 HEMOGLOBIN GLYCOSYLATED A1C: CPT | Performed by: FAMILY MEDICINE

## 2023-02-14 PROCEDURE — 81001 URINALYSIS AUTO W/SCOPE: CPT

## 2023-02-15 LAB — HBA1C MFR BLD: 6 % (ref 4.8–5.6)

## 2023-05-01 ENCOUNTER — TELEPHONE (OUTPATIENT)
Dept: FAMILY MEDICINE CLINIC | Age: 63
End: 2023-05-01
Payer: COMMERCIAL

## 2023-05-01 NOTE — TELEPHONE ENCOUNTER
----- Message from Jessica Hobson LPN sent at 1/26/2022  8:30 AM EST -----  TICKLE to call him after 5/1/2023 for Cologuard.  He was not sure if he wanted to do it or colonoscopy when we spoke today.  When you call him, confirm that he would like to move ahead with Cologuard and order.  Thanks

## 2023-09-22 ENCOUNTER — LAB (OUTPATIENT)
Dept: LAB | Facility: HOSPITAL | Age: 63
End: 2023-09-22
Payer: COMMERCIAL

## 2023-09-22 ENCOUNTER — OFFICE VISIT (OUTPATIENT)
Dept: FAMILY MEDICINE CLINIC | Age: 63
End: 2023-09-22
Payer: COMMERCIAL

## 2023-09-22 VITALS
SYSTOLIC BLOOD PRESSURE: 124 MMHG | BODY MASS INDEX: 30.23 KG/M2 | TEMPERATURE: 98.2 F | WEIGHT: 223.2 LBS | HEIGHT: 72 IN | DIASTOLIC BLOOD PRESSURE: 82 MMHG | HEART RATE: 93 BPM

## 2023-09-22 DIAGNOSIS — R10.30 LOWER ABDOMINAL PAIN: ICD-10-CM

## 2023-09-22 DIAGNOSIS — R19.7 DIARRHEA, UNSPECIFIED TYPE: ICD-10-CM

## 2023-09-22 DIAGNOSIS — R10.30 LOWER ABDOMINAL PAIN: Primary | ICD-10-CM

## 2023-09-22 PROBLEM — E78.5 HYPERLIPIDEMIA: Status: ACTIVE | Noted: 2020-02-12

## 2023-09-22 PROBLEM — I10 HYPERTENSIVE DISORDER: Status: ACTIVE | Noted: 2020-02-12

## 2023-09-22 PROBLEM — I71.21 ANEURYSM OF ASCENDING AORTA: Status: ACTIVE | Noted: 2020-02-12

## 2023-09-22 PROBLEM — N42.89 PROSTATE MASS: Status: ACTIVE | Noted: 2023-09-22

## 2023-09-22 PROBLEM — J30.2 SEASONAL ALLERGIC RHINITIS: Status: ACTIVE | Noted: 2020-02-19

## 2023-09-22 PROBLEM — H91.90 HEARING LOSS: Status: ACTIVE | Noted: 2020-02-19

## 2023-09-22 PROBLEM — J43.9 PULMONARY EMPHYSEMA: Status: ACTIVE | Noted: 2022-12-06

## 2023-09-22 LAB
ALBUMIN SERPL-MCNC: 4.8 G/DL (ref 3.5–5.2)
ALBUMIN/GLOB SERPL: 1.8 G/DL
ALP SERPL-CCNC: 86 U/L (ref 39–117)
ALT SERPL W P-5'-P-CCNC: 26 U/L (ref 1–41)
ANION GAP SERPL CALCULATED.3IONS-SCNC: 12.2 MMOL/L (ref 5–15)
AST SERPL-CCNC: 21 U/L (ref 1–40)
BASOPHILS # BLD AUTO: 0.04 10*3/MM3 (ref 0–0.2)
BASOPHILS NFR BLD AUTO: 0.3 % (ref 0–1.5)
BILIRUB SERPL-MCNC: 1.6 MG/DL (ref 0–1.2)
BUN SERPL-MCNC: 17 MG/DL (ref 8–23)
BUN/CREAT SERPL: 16.8 (ref 7–25)
CALCIUM SPEC-SCNC: 9.6 MG/DL (ref 8.6–10.5)
CHLORIDE SERPL-SCNC: 99 MMOL/L (ref 98–107)
CO2 SERPL-SCNC: 25.8 MMOL/L (ref 22–29)
CREAT SERPL-MCNC: 1.01 MG/DL (ref 0.76–1.27)
DEPRECATED RDW RBC AUTO: 45.5 FL (ref 37–54)
EGFRCR SERPLBLD CKD-EPI 2021: 83 ML/MIN/1.73
EOSINOPHIL # BLD AUTO: 0.04 10*3/MM3 (ref 0–0.4)
EOSINOPHIL NFR BLD AUTO: 0.3 % (ref 0.3–6.2)
ERYTHROCYTE [DISTWIDTH] IN BLOOD BY AUTOMATED COUNT: 13.1 % (ref 12.3–15.4)
GLOBULIN UR ELPH-MCNC: 2.6 GM/DL
GLUCOSE SERPL-MCNC: 89 MG/DL (ref 65–99)
HCT VFR BLD AUTO: 43.2 % (ref 37.5–51)
HGB BLD-MCNC: 14.5 G/DL (ref 13–17.7)
IMM GRANULOCYTES # BLD AUTO: 0.03 10*3/MM3 (ref 0–0.05)
IMM GRANULOCYTES NFR BLD AUTO: 0.3 % (ref 0–0.5)
LYMPHOCYTES # BLD AUTO: 2.24 10*3/MM3 (ref 0.7–3.1)
LYMPHOCYTES NFR BLD AUTO: 18.7 % (ref 19.6–45.3)
MCH RBC QN AUTO: 31.3 PG (ref 26.6–33)
MCHC RBC AUTO-ENTMCNC: 33.6 G/DL (ref 31.5–35.7)
MCV RBC AUTO: 93.3 FL (ref 79–97)
MONOCYTES # BLD AUTO: 1.14 10*3/MM3 (ref 0.1–0.9)
MONOCYTES NFR BLD AUTO: 9.5 % (ref 5–12)
NEUTROPHILS NFR BLD AUTO: 70.9 % (ref 42.7–76)
NEUTROPHILS NFR BLD AUTO: 8.48 10*3/MM3 (ref 1.7–7)
PLATELET # BLD AUTO: 222 10*3/MM3 (ref 140–450)
PMV BLD AUTO: 10.3 FL (ref 6–12)
POTASSIUM SERPL-SCNC: 4 MMOL/L (ref 3.5–5.2)
PROT SERPL-MCNC: 7.4 G/DL (ref 6–8.5)
RBC # BLD AUTO: 4.63 10*6/MM3 (ref 4.14–5.8)
SODIUM SERPL-SCNC: 137 MMOL/L (ref 136–145)
WBC NRBC COR # BLD: 11.97 10*3/MM3 (ref 3.4–10.8)

## 2023-09-22 PROCEDURE — 80053 COMPREHEN METABOLIC PANEL: CPT

## 2023-09-22 PROCEDURE — 36415 COLL VENOUS BLD VENIPUNCTURE: CPT

## 2023-09-22 PROCEDURE — 85025 COMPLETE CBC W/AUTO DIFF WBC: CPT

## 2023-09-22 PROCEDURE — 99213 OFFICE O/P EST LOW 20 MIN: CPT | Performed by: PHYSICIAN ASSISTANT

## 2023-09-22 RX ORDER — LISINOPRIL 10 MG/1
10 TABLET ORAL DAILY
COMMUNITY

## 2023-09-22 NOTE — PROGRESS NOTES
"  Diagnoses and all orders for this visit:    1. Lower abdominal pain (Primary)  Comments:  Exam is reassuring.  Will check labs as below to investigate further.  Possible diverticulitis.  Recommend bland diet, otc pain meds.  ER precautions given.  Orders:  -     CBC w AUTO Differential; Future  -     Comprehensive metabolic panel; Future    2. Diarrhea, unspecified type            Subjective     CHIEF COMPLAINT    Chief Complaint   Patient presents with    Abdominal Pain     Diarrhea, and bloating x 4 days             History of Present Illness  This is a 63-year-old male presenting to the clinic with a lower abdominal pain for the last 3 to 4 days.  He has had abdominal bloating and diarrhea associated with this pain.  The pain is a 7/10 dull ache that \"feels like the inside of his stomach is raw.\"  The pain is intermittent and is worse with food and sitting down.  He states that it improves when he stands up or stretches straight up.  He has not had any fevers, chills, or bloody stool.  He does not have any nausea or vomiting.  His bowel movements have been solid each morning and then after meals he has some watery diarrhea.  He has not been on any antibiotics recently, had any food or medication changes, and has not been traveling recently.  He denies any known sick contacts.  He does have a history of diverticulitis but states when he has had pain from that it has typically been in his back instead of his abdomen.  He has not had any abdominal surgeries previously.          Review of Systems   Constitutional:  Negative for chills and fever.   Gastrointestinal:  Positive for abdominal distention, abdominal pain and diarrhea. Negative for blood in stool, nausea and vomiting.   Musculoskeletal:  Negative for myalgias.          Past Medical History:   Diagnosis Date    Essential hypertension     Mixed hyperlipidemia             Past Surgical History:   Procedure Laterality Date    COLONOSCOPY  03/21/2023    " "Tubular adenoma    ENDOSCOPY  2012    Bile reflux, gastritis    FINGER SURGERY Left             Family History   Problem Relation Age of Onset    Diabetes type II Mother     Aortic aneurysm Father     Coronary artery disease Father     Stroke Father             Social History     Socioeconomic History    Marital status:     Number of children: 1   Tobacco Use    Smoking status: Former     Types: Cigarettes     Quit date: 10/21/2005     Years since quittin.9    Smokeless tobacco: Never   Substance and Sexual Activity    Alcohol use: Yes     Comment: 'about 30 beers weekly'            Allergies   Allergen Reactions    Aspirin Itching    Penicillins Hives            Current Outpatient Medications on File Prior to Visit   Medication Sig Dispense Refill    lisinopril (PRINIVIL,ZESTRIL) 10 MG tablet Take 1 tablet by mouth Daily.      lisinopril-hydrochlorothiazide (PRINZIDE,ZESTORETIC) 20-12.5 MG per tablet Take 1 tablet by mouth Daily. 90 tablet 3    rosuvastatin (CRESTOR) 10 MG tablet Take 1 tablet by mouth Daily. 90 tablet 3     No current facility-administered medications on file prior to visit.            /82 (BP Location: Right arm, Patient Position: Sitting)   Pulse 93   Temp 98.2 °F (36.8 °C) (Oral)   Ht 182.9 cm (72.01\")   Wt 101 kg (223 lb 3.2 oz)   BMI 30.26 kg/m²          Objective     Physical Exam  Vitals and nursing note reviewed.   Constitutional:       General: He is not in acute distress.     Appearance: Normal appearance.   Cardiovascular:      Rate and Rhythm: Normal rate and regular rhythm.      Heart sounds: Normal heart sounds.   Pulmonary:      Effort: Pulmonary effort is normal. No respiratory distress.      Breath sounds: Normal breath sounds.   Abdominal:      General: Bowel sounds are normal. There is no distension.      Palpations: Abdomen is soft.      Tenderness: There is abdominal tenderness in the suprapubic area and left lower quadrant. There is no guarding " or rebound.   Skin:     General: Skin is warm and dry.   Neurological:      Mental Status: He is alert and oriented to person, place, and time.   Psychiatric:         Mood and Affect: Mood normal.         Behavior: Behavior normal.               Diagnoses and all orders for this visit:    1. Lower abdominal pain (Primary)  Comments:  Exam is reassuring.  Will check labs as below to investigate further.  Possible diverticulitis.  Recommend bland diet, otc pain meds.  ER precautions given.  Orders:  -     CBC w AUTO Differential; Future  -     Comprehensive metabolic panel; Future    2. Diarrhea, unspecified type             Additional Instructions for the Follow-ups that You Need to Schedule       CBC w AUTO Differential    Sep 27, 2023 (Approximate)      Manual Differential: No   Release to patient: Routine Release        Comprehensive metabolic panel    Sep 27, 2023 (Approximate)      Release to patient: Routine Release                          FOR FULL DISCHARGE INSTRUCTIONS/COMMENTS/HANDOUTS please see the   AVS

## 2023-10-02 ENCOUNTER — TELEPHONE (OUTPATIENT)
Dept: FAMILY MEDICINE CLINIC | Age: 63
End: 2023-10-02

## 2023-10-02 NOTE — TELEPHONE ENCOUNTER
"    Caller: Ry Santo \"Simin\"    Relationship: Self    Best call back number: [phone]    Caller requesting test results: PATIENT    What test was performed: BLOOD LABS    When was the test performed: 09/22/2023    Where was the test performed: IN OFFICE    Additional notes: PATIENT HAS HAD LABS DONE IN OFFICE. HE W0ULD LIKE FOR DR WATT TO REVIEW RESULTS AND GO OVER WITH HIM. PLEASE CALL PATIENT BACK AND ADVISE. SUSHMA CAN CALL HIM BACK OR TEXT AS WELL.          "

## 2023-10-05 ENCOUNTER — OFFICE VISIT (OUTPATIENT)
Dept: FAMILY MEDICINE CLINIC | Age: 63
End: 2023-10-05
Payer: COMMERCIAL

## 2023-10-05 ENCOUNTER — LAB (OUTPATIENT)
Dept: LAB | Facility: HOSPITAL | Age: 63
End: 2023-10-05
Payer: COMMERCIAL

## 2023-10-05 VITALS
HEART RATE: 82 BPM | SYSTOLIC BLOOD PRESSURE: 135 MMHG | BODY MASS INDEX: 30.5 KG/M2 | TEMPERATURE: 98.1 F | WEIGHT: 225.2 LBS | DIASTOLIC BLOOD PRESSURE: 79 MMHG | HEIGHT: 72 IN

## 2023-10-05 DIAGNOSIS — D72.829 LEUKOCYTOSIS, UNSPECIFIED TYPE: Primary | ICD-10-CM

## 2023-10-05 DIAGNOSIS — R17 ELEVATED BILIRUBIN: ICD-10-CM

## 2023-10-05 DIAGNOSIS — D72.829 LEUKOCYTOSIS, UNSPECIFIED TYPE: ICD-10-CM

## 2023-10-05 LAB
ALBUMIN SERPL-MCNC: 4.5 G/DL (ref 3.5–5.2)
ALP SERPL-CCNC: 91 U/L (ref 39–117)
ALT SERPL W P-5'-P-CCNC: 39 U/L (ref 1–41)
AST SERPL-CCNC: 27 U/L (ref 1–40)
BASOPHILS # BLD AUTO: 0.05 10*3/MM3 (ref 0–0.2)
BASOPHILS NFR BLD AUTO: 0.6 % (ref 0–1.5)
BILIRUB CONJ SERPL-MCNC: <0.2 MG/DL (ref 0–0.3)
BILIRUB INDIRECT SERPL-MCNC: NORMAL MG/DL
BILIRUB SERPL-MCNC: 0.7 MG/DL (ref 0–1.2)
DEPRECATED RDW RBC AUTO: 44.4 FL (ref 37–54)
EOSINOPHIL # BLD AUTO: 0.16 10*3/MM3 (ref 0–0.4)
EOSINOPHIL NFR BLD AUTO: 2 % (ref 0.3–6.2)
ERYTHROCYTE [DISTWIDTH] IN BLOOD BY AUTOMATED COUNT: 12.8 % (ref 12.3–15.4)
HCT VFR BLD AUTO: 42.2 % (ref 37.5–51)
HGB BLD-MCNC: 14.1 G/DL (ref 13–17.7)
IMM GRANULOCYTES # BLD AUTO: 0.01 10*3/MM3 (ref 0–0.05)
IMM GRANULOCYTES NFR BLD AUTO: 0.1 % (ref 0–0.5)
LYMPHOCYTES # BLD AUTO: 2.37 10*3/MM3 (ref 0.7–3.1)
LYMPHOCYTES NFR BLD AUTO: 30.1 % (ref 19.6–45.3)
MCH RBC QN AUTO: 31.3 PG (ref 26.6–33)
MCHC RBC AUTO-ENTMCNC: 33.4 G/DL (ref 31.5–35.7)
MCV RBC AUTO: 93.8 FL (ref 79–97)
MONOCYTES # BLD AUTO: 0.72 10*3/MM3 (ref 0.1–0.9)
MONOCYTES NFR BLD AUTO: 9.1 % (ref 5–12)
NEUTROPHILS NFR BLD AUTO: 4.56 10*3/MM3 (ref 1.7–7)
NEUTROPHILS NFR BLD AUTO: 58.1 % (ref 42.7–76)
PLATELET # BLD AUTO: 263 10*3/MM3 (ref 140–450)
PMV BLD AUTO: 9.6 FL (ref 6–12)
PROT SERPL-MCNC: 7 G/DL (ref 6–8.5)
RBC # BLD AUTO: 4.5 10*6/MM3 (ref 4.14–5.8)
WBC NRBC COR # BLD: 7.87 10*3/MM3 (ref 3.4–10.8)

## 2023-10-05 PROCEDURE — 85025 COMPLETE CBC W/AUTO DIFF WBC: CPT

## 2023-10-05 PROCEDURE — 36415 COLL VENOUS BLD VENIPUNCTURE: CPT

## 2023-10-05 PROCEDURE — 99213 OFFICE O/P EST LOW 20 MIN: CPT | Performed by: FAMILY MEDICINE

## 2023-10-05 PROCEDURE — 80076 HEPATIC FUNCTION PANEL: CPT

## 2023-10-05 NOTE — PROGRESS NOTES
Ry Santo presents to [facility].    Chief Complaint:  Follow up on abdominal pain    Subjective   History of Present Illness:  Simin is being seen today for follow-up on lower abdominal pain.  He was seen here recently by Jen Harrington PA-C and had evaluation including blood work.  He states that his abdominal pain is actually better at this time.  He is not having any ongoing symptoms.  He did have colonoscopy in March of this year.  There was no worrisome finding with it.    Simin was noted to have a mild elevation of the white blood count as well as the bilirubin level on recent testing.  He does not feel sick at all at this time.  He has not had any recent imaging of the liver or biliary tree.    Review of Systems:  Review of Systems   Constitutional:  Negative for chills and fever.   Respiratory:  Negative for cough and shortness of breath.    Cardiovascular:  Negative for chest pain and palpitations.   Gastrointestinal:  Negative for abdominal pain, nausea and vomiting.      Objective   Medical History:  Past Medical History:    Essential hypertension    Mixed hyperlipidemia     Past Surgical History:    COLONOSCOPY    Tubular adenoma    ENDOSCOPY    Bile reflux, gastritis    FINGER SURGERY      Family History   Problem Relation Age of Onset    Diabetes type II Mother     Aortic aneurysm Father     Coronary artery disease Father     Stroke Father      Social History     Tobacco Use    Smoking status: Former     Types: Cigarettes     Quit date: 10/21/2005     Years since quittin.9    Smokeless tobacco: Never   Substance Use Topics    Alcohol use: Yes     Comment: 'about 30 beers weekly'       Health Maintenance Due   Topic Date Due    Pneumococcal Vaccine 0-64 (1 - PCV) Never done    TDAP/TD VACCINES (1 - Tdap) Never done    ZOSTER VACCINE (1 of 2) Never done        Immunization History   Administered Date(s) Administered    COVID-19 (PFIZER) Purple Cap Monovalent  "03/09/2021, 04/06/2021    Fluzone (or Fluarix & Flulaval for VFC) >6mos 10/26/2020       Allergies   Allergen Reactions    Aspirin Itching    Penicillins Hives        Medications:  Current Outpatient Medications on File Prior to Visit   Medication Sig    lisinopril (PRINIVIL,ZESTRIL) 10 MG tablet Take 1 tablet by mouth Daily.    lisinopril-hydrochlorothiazide (PRINZIDE,ZESTORETIC) 20-12.5 MG per tablet Take 1 tablet by mouth Daily.    rosuvastatin (CRESTOR) 10 MG tablet Take 1 tablet by mouth Daily.     No current facility-administered medications on file prior to visit.       Vital Signs:   Vitals:    10/05/23 1402 10/05/23 1415   BP: 147/88 135/79   BP Location: Right arm Right arm   Patient Position: Sitting Sitting   Pulse: 82 82   Temp: 98.1 °F (36.7 °C)    TempSrc: Oral    Weight: 102 kg (225 lb 3.2 oz)    Height: 182.9 cm (72.01\")    Body mass index is 30.54 kg/m².    Physical Exam:  Physical Exam  Vitals reviewed.   Constitutional:       General: He is not in acute distress.     Appearance: He is not ill-appearing.   Eyes:      Pupils: Pupils are equal, round, and reactive to light.   Neck:      Comments: No thyromegaly  Cardiovascular:      Rate and Rhythm: Normal rate and regular rhythm.   Pulmonary:      Effort: Pulmonary effort is normal.      Breath sounds: Normal breath sounds.   Abdominal:      General: There is no distension.      Palpations: Abdomen is soft.      Tenderness: There is no abdominal tenderness.   Musculoskeletal:      Cervical back: Neck supple.   Lymphadenopathy:      Cervical: No cervical adenopathy.   Skin:     Findings: No lesion or rash.   Neurological:      Mental Status: He is alert.       Result Review   The following data was reviewed by Ward Rdz MD on 10/05/2023.  Lab Results   Component Value Date    WBC 11.97 (H) 09/22/2023    HGB 14.5 09/22/2023    HCT 43.2 09/22/2023    MCV 93.3 09/22/2023     09/22/2023     Lab Results   Component Value Date    " GLUCOSE 89 09/22/2023    BUN 17 09/22/2023    CREATININE 1.01 09/22/2023     09/22/2023    K 4.0 09/22/2023    CL 99 09/22/2023    CO2 25.8 09/22/2023    CALCIUM 9.6 09/22/2023    PROTEINTOT 7.4 09/22/2023    ALBUMIN 4.8 09/22/2023    ALT 26 09/22/2023    AST 21 09/22/2023    ALKPHOS 86 09/22/2023    BILITOT 1.6 (H) 09/22/2023    EGFR 83.0 09/22/2023    GLOB 2.6 09/22/2023    AGRATIO 1.8 09/22/2023    BCR 16.8 09/22/2023    ANIONGAP 12.2 09/22/2023      Lab Results   Component Value Date    CHOL 94 02/14/2023    CHLPL 143 04/02/2021    TRIG 36 02/14/2023    HDL 50 02/14/2023    LDL 33 02/14/2023     Lab Results   Component Value Date    TSH 0.789 02/14/2023     Lab Results   Component Value Date    HGBA1C 6.00 (H) 02/14/2023     Lab Results   Component Value Date    PSA 0.617 02/14/2023    PSA 0.639 10/21/2021    PSA 1.08 07/07/2020            Assessment and Plan:   Today, we have reviewed his care.  His symptoms have resolved, and his physical exam is normal.  We will move ahead with repeat labs as noted below.  His blood pressure is mildly elevated, and will be rechecked before he leaves.  Tentative follow-up will be in February as scheduled.    Diagnoses and all orders for this visit:    1. Leukocytosis, unspecified type (Primary)  -     CBC Auto Differential; Future    2. Elevated bilirubin  -     Hepatic Function Panel; Future    Follow Up  Return in about 4 months (around 2/6/2024) for Recheck as scheduled.  Patient was given instructions and counseling regarding his condition or for health maintenance advice. Please see specific information pulled into the AVS if appropriate.

## 2024-01-30 DIAGNOSIS — I10 ESSENTIAL (PRIMARY) HYPERTENSION: ICD-10-CM

## 2024-01-31 RX ORDER — LISINOPRIL AND HYDROCHLOROTHIAZIDE 20; 12.5 MG/1; MG/1
1 TABLET ORAL DAILY
Qty: 90 TABLET | Refills: 0 | Status: SHIPPED | OUTPATIENT
Start: 2024-01-31

## 2024-02-06 ENCOUNTER — OFFICE VISIT (OUTPATIENT)
Dept: FAMILY MEDICINE CLINIC | Age: 64
End: 2024-02-06
Payer: COMMERCIAL

## 2024-02-06 VITALS
HEIGHT: 72 IN | TEMPERATURE: 98.1 F | SYSTOLIC BLOOD PRESSURE: 140 MMHG | HEART RATE: 82 BPM | BODY MASS INDEX: 31.07 KG/M2 | DIASTOLIC BLOOD PRESSURE: 90 MMHG | WEIGHT: 229.4 LBS

## 2024-02-06 DIAGNOSIS — J43.1 PANLOBULAR EMPHYSEMA: ICD-10-CM

## 2024-02-06 DIAGNOSIS — Z12.5 SCREENING FOR PROSTATE CANCER: ICD-10-CM

## 2024-02-06 DIAGNOSIS — I10 ESSENTIAL (PRIMARY) HYPERTENSION: ICD-10-CM

## 2024-02-06 DIAGNOSIS — I71.21 ANEURYSM OF ASCENDING AORTA WITHOUT RUPTURE: ICD-10-CM

## 2024-02-06 DIAGNOSIS — E66.09 CLASS 1 OBESITY DUE TO EXCESS CALORIES WITH SERIOUS COMORBIDITY AND BODY MASS INDEX (BMI) OF 31.0 TO 31.9 IN ADULT: ICD-10-CM

## 2024-02-06 DIAGNOSIS — E78.2 MIXED HYPERLIPIDEMIA: ICD-10-CM

## 2024-02-06 DIAGNOSIS — Z00.00 PHYSICAL EXAM: Primary | ICD-10-CM

## 2024-02-06 PROBLEM — D12.6 TUBULAR ADENOMA OF COLON: Status: ACTIVE | Noted: 2024-02-06

## 2024-02-06 PROBLEM — E66.811 CLASS 1 OBESITY DUE TO EXCESS CALORIES WITH SERIOUS COMORBIDITY AND BODY MASS INDEX (BMI) OF 31.0 TO 31.9 IN ADULT: Status: ACTIVE | Noted: 2024-02-06

## 2024-02-06 PROCEDURE — 99396 PREV VISIT EST AGE 40-64: CPT | Performed by: FAMILY MEDICINE

## 2024-02-06 RX ORDER — LISINOPRIL AND HYDROCHLOROTHIAZIDE 20; 12.5 MG/1; MG/1
1 TABLET ORAL DAILY
Qty: 90 TABLET | Refills: 3 | Status: SHIPPED | OUTPATIENT
Start: 2024-02-06

## 2024-02-06 RX ORDER — ROSUVASTATIN CALCIUM 10 MG/1
10 TABLET, COATED ORAL DAILY
Qty: 90 TABLET | Refills: 3 | Status: SHIPPED | OUTPATIENT
Start: 2024-02-06

## 2024-02-06 NOTE — PROGRESS NOTES
Ry Santo presents to [facility].    Chief Complaint:  Physical exam, blood pressure, cholesterol    Subjective   History of Present Illness:  Simin is in today for physical exam.  He is 63 years old and is working for [employer] right now.  He does not smoke after stopping in roughly .  He tends to drink more beer in the summer, but he is not drinking much right now.  Elizabeth she had a colonoscopy last year and had a tubular adenoma.  He will be due for repeat exam in roughly 4 years.  He is due for prostate check today.     Simin also has hypertension and elevated cholesterol.  He remains on treatment for these issues.  His blood pressure is mildly elevated here.  He states it typically will run a little high on the initial check.  He tolerates the medications fairly well.    Review of Systems:  Review of Systems   Constitutional:  Negative for chills and fever.   Respiratory:  Negative for cough and shortness of breath.    Cardiovascular:  Negative for chest pain and palpitations.   Gastrointestinal:  Negative for abdominal pain, nausea and vomiting.      Objective   Medical History:  Past Medical History:    Essential hypertension    Mixed hyperlipidemia    Tubular adenoma of colon     Past Surgical History:    COLONOSCOPY    Tubular adenoma    ENDOSCOPY    Bile reflux, gastritis    FINGER SURGERY      Family History   Problem Relation Age of Onset    Diabetes type II Mother     Diabetes Mother     Aortic aneurysm Father     Coronary artery disease Father     Stroke Father      Social History     Tobacco Use    Smoking status: Former     Packs/day: 1.50     Years: 15.00     Additional pack years: 0.00     Total pack years: 22.50     Types: Cigarettes     Start date: 1976     Quit date: 10/21/2005     Years since quittin.3    Smokeless tobacco: Never   Substance Use Topics    Alcohol use: Yes     Alcohol/week: 5.0 standard drinks of alcohol     Types: 5 Cans  "of beer per week     Comment: 'about 30 beers weekly'       Health Maintenance Due   Topic Date Due    Pneumococcal Vaccine 0-64 (1 of 2 - PCV) Never done    TDAP/TD VACCINES (1 - Tdap) Never done    ZOSTER VACCINE (1 of 2) Never done    RSV Vaccine - Adults >60 Years or Pregnant 32-36 Weeks (1 - 1-dose 60+ series) Never done        Immunization History   Administered Date(s) Administered    COVID-19 (PFIZER) Purple Cap Monovalent 03/09/2021, 04/06/2021    Fluzone (or Fluarix & Flulaval for VFC) >6mos 10/26/2020       Allergies   Allergen Reactions    Aspirin Itching    Penicillins Hives        Medications:  Current Outpatient Medications on File Prior to Visit   Medication Sig    [DISCONTINUED] lisinopril-hydrochlorothiazide (PRINZIDE,ZESTORETIC) 20-12.5 MG per tablet TAKE ONE TABLET BY MOUTH DAILY    [DISCONTINUED] rosuvastatin (CRESTOR) 10 MG tablet Take 1 tablet by mouth Daily.     No current facility-administered medications on file prior to visit.       Vital Signs:   Vitals:    02/06/24 1550 02/06/24 1635   BP: 147/85 140/90   BP Location: Left arm Left arm   Patient Position: Sitting Sitting   Pulse: 82 82   Temp: 98.1 °F (36.7 °C)    TempSrc: Oral    Weight: 104 kg (229 lb 6.4 oz)    Height: 182.9 cm (72.01\")    Body mass index is 31.11 kg/m².    Physical Exam:  Physical Exam  Vitals and nursing note reviewed. Chaperone present: patient declined chaperone.   Constitutional:       General: He is not in acute distress.     Appearance: He is not ill-appearing.   HENT:      Right Ear: Tympanic membrane and ear canal normal.      Left Ear: Tympanic membrane and ear canal normal.      Mouth/Throat:      Mouth: Mucous membranes are moist.      Comments: Pharynx appears normal  Eyes:      Extraocular Movements: Extraocular movements intact.      Pupils: Pupils are equal, round, and reactive to light.   Neck:      Thyroid: No thyromegaly.   Cardiovascular:      Rate and Rhythm: Normal rate and regular rhythm.      " Heart sounds: No murmur heard.  Pulmonary:      Effort: Pulmonary effort is normal.      Breath sounds: Normal breath sounds.   Abdominal:      General: There is no distension.      Palpations: Abdomen is soft. There is no mass.      Tenderness: There is no abdominal tenderness.   Genitourinary:     Prostate: Not enlarged and no nodules present.      Rectum: Guaiac result negative. No mass.   Musculoskeletal:      Cervical back: Normal range of motion.   Skin:     Findings: No lesion or rash.   Neurological:      General: No focal deficit present.      Mental Status: He is oriented to person, place, and time.      Cranial Nerves: No cranial nerve deficit.   Psychiatric:         Mood and Affect: Mood normal.         Result Review   The following data was reviewed by Ward Rdz MD on 02/06/2024.  Lab Results   Component Value Date    WBC 7.87 10/05/2023    HGB 14.1 10/05/2023    HCT 42.2 10/05/2023    MCV 93.8 10/05/2023     10/05/2023     Lab Results   Component Value Date    GLUCOSE 89 09/22/2023    BUN 17 09/22/2023    CREATININE 1.01 09/22/2023     09/22/2023    K 4.0 09/22/2023    CL 99 09/22/2023    CO2 25.8 09/22/2023    CALCIUM 9.6 09/22/2023    PROTEINTOT 7.0 10/05/2023    ALBUMIN 4.5 10/05/2023    ALT 39 10/05/2023    AST 27 10/05/2023    ALKPHOS 91 10/05/2023    BILITOT 0.7 10/05/2023    EGFR 83.0 09/22/2023    GLOB 2.6 09/22/2023    AGRATIO 1.8 09/22/2023    BCR 16.8 09/22/2023    ANIONGAP 12.2 09/22/2023      Lab Results   Component Value Date    CHOL 94 02/14/2023    CHLPL 143 04/02/2021    TRIG 36 02/14/2023    HDL 50 02/14/2023    LDL 33 02/14/2023     Lab Results   Component Value Date    TSH 0.789 02/14/2023     Lab Results   Component Value Date    HGBA1C 6.00 (H) 02/14/2023     Lab Results   Component Value Date    PSA 0.617 02/14/2023    PSA 0.639 10/21/2021    PSA 1.08 07/07/2020     BMI is >= 30 and <35. (Class 1 Obesity). The following options were offered after  discussion;: exercise counseling/recommendations and nutrition counseling/recommendations         Assessment and Plan:   Today, we have reviewed his care.  Regarding the annual physical, he is basically up-to-date on recommended cancer screenings.  We will move ahead with PSA and labs in about 2 weeks.  It is a little early to get those right now.  His blood pressure is mildly elevated, and we will recheck it before he leaves.  Otherwise, we will refill his usual medications and reach out after his labs are completed.  I did recommend he check on Prevnar 20.    Diagnoses and all orders for this visit:    1. Physical exam (Primary)  -     CBC (No Diff); Future  -     Comprehensive Metabolic Panel; Future  -     Lipid Panel; Future  -     TSH; Future  -     PSA Screen; Future  -     Hemoglobin A1c; Future    2. Essential (primary) hypertension  -     lisinopril-hydrochlorothiazide (PRINZIDE,ZESTORETIC) 20-12.5 MG per tablet; Take 1 tablet by mouth Daily.  Dispense: 90 tablet; Refill: 3  -     Comprehensive Metabolic Panel; Future    3. Mixed hyperlipidemia  -     rosuvastatin (CRESTOR) 10 MG tablet; Take 1 tablet by mouth Daily.  Dispense: 90 tablet; Refill: 3  -     Comprehensive Metabolic Panel; Future  -     Lipid Panel; Future  -     TSH; Future    4. Screening for prostate cancer  -     PSA Screen; Future    5. Class 1 obesity due to excess calories with serious comorbidity and body mass index (BMI) of 31.0 to 31.9 in adult  Comments:  Continue efforts toward gradual weight loss.    6. Aneurysm of ascending aorta without rupture  Comments:  As above.    7. Panlobular emphysema  Comments:  As above.    Follow Up  Return in about 1 year (around 2/6/2025) for Recheck, Annual physical.  Patient was given instructions and counseling regarding his condition or for health maintenance advice. Please see specific information pulled into the AVS if appropriate.

## 2024-02-15 ENCOUNTER — TELEPHONE (OUTPATIENT)
Dept: FAMILY MEDICINE CLINIC | Age: 64
End: 2024-02-15
Payer: COMMERCIAL

## 2024-02-15 NOTE — TELEPHONE ENCOUNTER
----- Message from Ward Rdz MD sent at 2/6/2024  4:26 PM EST -----  Please let Simin know that he may stop in for fasting blood work at his convenience.  Thanks.

## 2024-03-05 ENCOUNTER — LAB (OUTPATIENT)
Dept: LAB | Facility: HOSPITAL | Age: 64
End: 2024-03-05
Payer: COMMERCIAL

## 2024-03-05 DIAGNOSIS — Z00.00 PHYSICAL EXAM: ICD-10-CM

## 2024-03-05 DIAGNOSIS — E78.2 MIXED HYPERLIPIDEMIA: ICD-10-CM

## 2024-03-05 DIAGNOSIS — Z12.5 SCREENING FOR PROSTATE CANCER: ICD-10-CM

## 2024-03-05 DIAGNOSIS — I10 ESSENTIAL (PRIMARY) HYPERTENSION: ICD-10-CM

## 2024-03-05 LAB
ALBUMIN SERPL-MCNC: 4.5 G/DL (ref 3.5–5.2)
ALBUMIN/GLOB SERPL: 2 G/DL
ALP SERPL-CCNC: 78 U/L (ref 39–117)
ALT SERPL W P-5'-P-CCNC: 30 U/L (ref 1–41)
ANION GAP SERPL CALCULATED.3IONS-SCNC: 8 MMOL/L (ref 5–15)
AST SERPL-CCNC: 24 U/L (ref 1–40)
BILIRUB SERPL-MCNC: 0.6 MG/DL (ref 0–1.2)
BUN SERPL-MCNC: 16 MG/DL (ref 8–23)
BUN/CREAT SERPL: 17 (ref 7–25)
CALCIUM SPEC-SCNC: 9.5 MG/DL (ref 8.6–10.5)
CHLORIDE SERPL-SCNC: 106 MMOL/L (ref 98–107)
CHOLEST SERPL-MCNC: 142 MG/DL (ref 0–200)
CO2 SERPL-SCNC: 26 MMOL/L (ref 22–29)
CREAT SERPL-MCNC: 0.94 MG/DL (ref 0.76–1.27)
DEPRECATED RDW RBC AUTO: 45.5 FL (ref 37–54)
EGFRCR SERPLBLD CKD-EPI 2021: 90.5 ML/MIN/1.73
ERYTHROCYTE [DISTWIDTH] IN BLOOD BY AUTOMATED COUNT: 13.1 % (ref 12.3–15.4)
GLOBULIN UR ELPH-MCNC: 2.2 GM/DL
GLUCOSE SERPL-MCNC: 109 MG/DL (ref 65–99)
HBA1C MFR BLD: 6.1 % (ref 4.8–5.6)
HCT VFR BLD AUTO: 47 % (ref 37.5–51)
HDLC SERPL-MCNC: 58 MG/DL (ref 40–60)
HGB BLD-MCNC: 16 G/DL (ref 13–17.7)
LDLC SERPL CALC-MCNC: 66 MG/DL (ref 0–100)
LDLC/HDLC SERPL: 1.11 {RATIO}
MCH RBC QN AUTO: 31.5 PG (ref 26.6–33)
MCHC RBC AUTO-ENTMCNC: 34 G/DL (ref 31.5–35.7)
MCV RBC AUTO: 92.5 FL (ref 79–97)
PLATELET # BLD AUTO: 229 10*3/MM3 (ref 140–450)
PMV BLD AUTO: 10.8 FL (ref 6–12)
POTASSIUM SERPL-SCNC: 4.7 MMOL/L (ref 3.5–5.2)
PROT SERPL-MCNC: 6.7 G/DL (ref 6–8.5)
PSA SERPL-MCNC: 0.72 NG/ML (ref 0–4)
RBC # BLD AUTO: 5.08 10*6/MM3 (ref 4.14–5.8)
SODIUM SERPL-SCNC: 140 MMOL/L (ref 136–145)
TRIGL SERPL-MCNC: 99 MG/DL (ref 0–150)
TSH SERPL DL<=0.05 MIU/L-ACNC: 1.37 UIU/ML (ref 0.27–4.2)
VLDLC SERPL-MCNC: 18 MG/DL (ref 5–40)
WBC NRBC COR # BLD AUTO: 7.12 10*3/MM3 (ref 3.4–10.8)

## 2024-03-05 PROCEDURE — 80050 GENERAL HEALTH PANEL: CPT

## 2024-03-05 PROCEDURE — 80061 LIPID PANEL: CPT

## 2024-03-05 PROCEDURE — 83036 HEMOGLOBIN GLYCOSYLATED A1C: CPT

## 2024-03-05 PROCEDURE — G0103 PSA SCREENING: HCPCS

## 2024-03-05 PROCEDURE — 36415 COLL VENOUS BLD VENIPUNCTURE: CPT

## 2024-03-25 ENCOUNTER — TRANSCRIBE ORDERS (OUTPATIENT)
Dept: ADMINISTRATIVE | Facility: HOSPITAL | Age: 64
End: 2024-03-25
Payer: COMMERCIAL

## 2024-03-25 DIAGNOSIS — I71.21 ANEURYSM OF ASCENDING AORTA WITHOUT RUPTURE: Primary | ICD-10-CM

## 2024-06-06 ENCOUNTER — HOSPITAL ENCOUNTER (OUTPATIENT)
Dept: CT IMAGING | Facility: HOSPITAL | Age: 64
Discharge: HOME | End: 2024-06-06
Payer: COMMERCIAL

## 2024-06-06 DIAGNOSIS — I71.21 ANEURYSM OF ASCENDING AORTA WITHOUT RUPTURE: ICD-10-CM

## 2024-06-06 PROCEDURE — 71250 CT THORAX DX C-: CPT

## 2024-06-21 ENCOUNTER — TELEPHONE (OUTPATIENT)
Dept: FAMILY MEDICINE CLINIC | Age: 64
End: 2024-06-21
Payer: COMMERCIAL

## 2024-06-21 NOTE — TELEPHONE ENCOUNTER
Pt wants to know if lung nodule seen on recent scan was same one on previous scans. Please advise.

## 2024-06-21 NOTE — TELEPHONE ENCOUNTER
Please let Simin know that I have reviewed the radiology report.  The radiologist did compare the findings on the CT to the most recent exam from 2 years ago.  The nodules do not appear to have changed.  There is no new finding that would suggest developing lung cancer.  Confirm with him when he expects to have a follow-up CT.  Let me know about this.  Also, let me know if he has other concerns, and forward him a copy of this testing if he would like.  Thanks.

## 2024-06-21 NOTE — TELEPHONE ENCOUNTER
"Caller: Ry Santo \"Simin\"    Relationship: Self    Best call back number: [phone]        What test was performed: CT SCAN    When was the test performed:  06/06/2024     Where was the test performed: [facility]     Additional notes: PATIENT RECEIVED LETTER STATING THAT A NODULE WAS FOUND  ON THE CT SCAN AND HE NEEDS TO DISCUSS THIS WITH SOMEONE   THERE IS A DOCTOR IN [facility] THAT ALSO NEEDS TO REVIEW THE RESULTS AND CONFIRM THAT THE NODULE FOUND IS THE SAME ONE AS BEFORE         "

## 2024-06-24 ENCOUNTER — PATIENT MESSAGE (OUTPATIENT)
Dept: FAMILY MEDICINE CLINIC | Age: 64
End: 2024-06-24
Payer: COMMERCIAL

## 2025-01-15 ENCOUNTER — HOSPITAL ENCOUNTER (OUTPATIENT)
Dept: GENERAL RADIOLOGY | Facility: HOSPITAL | Age: 65
Discharge: HOME | End: 2025-01-15
Payer: COMMERCIAL

## 2025-01-15 ENCOUNTER — OFFICE VISIT (OUTPATIENT)
Dept: FAMILY MEDICINE CLINIC | Age: 65
End: 2025-01-15
Payer: COMMERCIAL

## 2025-01-15 ENCOUNTER — LAB (OUTPATIENT)
Dept: LAB | Facility: HOSPITAL | Age: 65
End: 2025-01-15
Payer: COMMERCIAL

## 2025-01-15 VITALS
HEIGHT: 72 IN | OXYGEN SATURATION: 95 % | SYSTOLIC BLOOD PRESSURE: 135 MMHG | DIASTOLIC BLOOD PRESSURE: 76 MMHG | BODY MASS INDEX: 28.44 KG/M2 | TEMPERATURE: 102.9 F | WEIGHT: 210 LBS | HEART RATE: 109 BPM

## 2025-01-15 DIAGNOSIS — R50.9 FEVER, UNSPECIFIED FEVER CAUSE: ICD-10-CM

## 2025-01-15 DIAGNOSIS — R05.1 ACUTE COUGH: ICD-10-CM

## 2025-01-15 DIAGNOSIS — R05.1 ACUTE COUGH: Primary | ICD-10-CM

## 2025-01-15 LAB
ALBUMIN SERPL-MCNC: 4 G/DL (ref 3.5–5.2)
ALBUMIN/GLOB SERPL: 1.3 G/DL
ALP SERPL-CCNC: 62 U/L (ref 39–117)
ALT SERPL W P-5'-P-CCNC: 93 U/L (ref 1–41)
ANION GAP SERPL CALCULATED.3IONS-SCNC: 13.6 MMOL/L (ref 5–15)
AST SERPL-CCNC: 110 U/L (ref 1–40)
BASOPHILS # BLD AUTO: 0.03 10*3/MM3 (ref 0–0.2)
BASOPHILS NFR BLD AUTO: 0.3 % (ref 0–1.5)
BILIRUB SERPL-MCNC: 1.5 MG/DL (ref 0–1.2)
BUN SERPL-MCNC: 14 MG/DL (ref 8–23)
BUN/CREAT SERPL: 14.1 (ref 7–25)
CALCIUM SPEC-SCNC: 9 MG/DL (ref 8.6–10.5)
CHLORIDE SERPL-SCNC: 86 MMOL/L (ref 98–107)
CO2 SERPL-SCNC: 28.4 MMOL/L (ref 22–29)
CREAT SERPL-MCNC: 0.99 MG/DL (ref 0.76–1.27)
DEPRECATED RDW RBC AUTO: 42.8 FL (ref 37–54)
EGFRCR SERPLBLD CKD-EPI 2021: 84.5 ML/MIN/1.73
EOSINOPHIL # BLD AUTO: 0 10*3/MM3 (ref 0–0.4)
EOSINOPHIL NFR BLD AUTO: 0 % (ref 0.3–6.2)
ERYTHROCYTE [DISTWIDTH] IN BLOOD BY AUTOMATED COUNT: 12.7 % (ref 12.3–15.4)
EXPIRATION DATE: NORMAL
FLUAV AG UPPER RESP QL IA.RAPID: NOT DETECTED
FLUBV AG UPPER RESP QL IA.RAPID: NOT DETECTED
GLOBULIN UR ELPH-MCNC: 3.2 GM/DL
GLUCOSE SERPL-MCNC: 119 MG/DL (ref 65–99)
HCT VFR BLD AUTO: 40.1 % (ref 37.5–51)
HGB BLD-MCNC: 13.8 G/DL (ref 13–17.7)
IMM GRANULOCYTES # BLD AUTO: 0.03 10*3/MM3 (ref 0–0.05)
IMM GRANULOCYTES NFR BLD AUTO: 0.3 % (ref 0–0.5)
INTERNAL CONTROL: NORMAL
LYMPHOCYTES # BLD AUTO: 1.11 10*3/MM3 (ref 0.7–3.1)
LYMPHOCYTES NFR BLD AUTO: 11.4 % (ref 19.6–45.3)
Lab: NORMAL
MCH RBC QN AUTO: 31.2 PG (ref 26.6–33)
MCHC RBC AUTO-ENTMCNC: 34.4 G/DL (ref 31.5–35.7)
MCV RBC AUTO: 90.5 FL (ref 79–97)
MONOCYTES # BLD AUTO: 1.04 10*3/MM3 (ref 0.1–0.9)
MONOCYTES NFR BLD AUTO: 10.7 % (ref 5–12)
NEUTROPHILS NFR BLD AUTO: 7.52 10*3/MM3 (ref 1.7–7)
NEUTROPHILS NFR BLD AUTO: 77.3 % (ref 42.7–76)
PLATELET # BLD AUTO: 165 10*3/MM3 (ref 140–450)
PMV BLD AUTO: 11.1 FL (ref 6–12)
POTASSIUM SERPL-SCNC: 3.5 MMOL/L (ref 3.5–5.2)
PROT SERPL-MCNC: 7.2 G/DL (ref 6–8.5)
RBC # BLD AUTO: 4.43 10*6/MM3 (ref 4.14–5.8)
SARS-COV-2 AG UPPER RESP QL IA.RAPID: NOT DETECTED
SODIUM SERPL-SCNC: 128 MMOL/L (ref 136–145)
WBC NRBC COR # BLD AUTO: 9.73 10*3/MM3 (ref 3.4–10.8)

## 2025-01-15 PROCEDURE — 85025 COMPLETE CBC W/AUTO DIFF WBC: CPT

## 2025-01-15 PROCEDURE — 36415 COLL VENOUS BLD VENIPUNCTURE: CPT

## 2025-01-15 PROCEDURE — 87428 SARSCOV & INF VIR A&B AG IA: CPT | Performed by: NURSE PRACTITIONER

## 2025-01-15 PROCEDURE — 80053 COMPREHEN METABOLIC PANEL: CPT

## 2025-01-15 PROCEDURE — 71046 X-RAY EXAM CHEST 2 VIEWS: CPT

## 2025-01-15 PROCEDURE — 99213 OFFICE O/P EST LOW 20 MIN: CPT | Performed by: NURSE PRACTITIONER

## 2025-01-15 RX ORDER — LEVOFLOXACIN 750 MG/1
750 TABLET, FILM COATED ORAL DAILY
Qty: 5 TABLET | Refills: 0 | Status: SHIPPED | OUTPATIENT
Start: 2025-01-15 | End: 2025-01-20

## 2025-01-15 RX ORDER — LISINOPRIL 10 MG/1
10 TABLET ORAL DAILY
COMMUNITY

## 2025-01-15 NOTE — PROGRESS NOTES
"Chief Complaint  Cough (Pt c/o cough, drainage in his chest, body aches, chills./X4 days)    Subjective          Ry Santo presents to [facility]  History of Present Illness  No known sick contacts.  Cough  This is a new problem. The current episode started in the past 7 days. The problem has been worse. Cough characteristics: \"black wads\" coughed up. Associated symptoms include a fever, myalgias, nasal congestion, rhinorrhea, a sore throat (drinking coffe burns his throat), shortness of breath and wheezing. Pertinent negatives include no chills, ear congestion, ear pain, headaches or postnasal drip. Associated symptoms comments: Admits: loss of taste and smell. Treatments tried: Tylenol. The treatment provided no relief.       Objective   Vital Signs:   /76 (BP Location: Left arm, Patient Position: Sitting)   Pulse 109   Temp (!) 102.9 °F (39.4 °C) (Oral)   Ht 182.9 cm (72.01\")   Wt 95.3 kg (210 lb)   SpO2 95% Comment: room air  BMI 28.47 kg/m²     Physical Exam  Constitutional:       Appearance: Normal appearance. He is normal weight.   HENT:      Head: Normocephalic.      Right Ear: Tympanic membrane, ear canal and external ear normal.      Left Ear: Tympanic membrane, ear canal and external ear normal.      Nose: Congestion present.      Mouth/Throat:      Mouth: Mucous membranes are moist.      Pharynx: Oropharynx is clear. No oropharyngeal exudate or posterior oropharyngeal erythema.   Eyes:      Conjunctiva/sclera: Conjunctivae normal.      Right eye: Exudate present.      Left eye: Exudate present.      Pupils: Pupils are equal, round, and reactive to light.   Cardiovascular:      Rate and Rhythm: Regular rhythm. Tachycardia present.      Pulses: Normal pulses.      Heart sounds: Normal heart sounds.   Pulmonary:      Effort: Pulmonary effort is normal.      Breath sounds: Wheezing present.   Musculoskeletal:      Cervical back: Normal range of motion. "   Neurological:      Mental Status: He is alert and oriented to person, place, and time.   Psychiatric:         Mood and Affect: Mood normal.         Behavior: Behavior normal.         Thought Content: Thought content normal.        Result Review :   The following data was reviewed by: DAVE Bray on 01/15/2025:                  Assessment and Plan    Diagnoses and all orders for this visit:    1. Acute cough (Primary)  -     POCT SARS-CoV-2 Antigen TAMRA + Flu  -     XR Chest PA & Lateral; Future  -     CBC w AUTO Differential; Future  -     Comprehensive Metabolic Panel; Future  -     levoFLOXacin (Levaquin) 750 MG tablet; Take 1 tablet by mouth Daily for 5 days.  Dispense: 5 tablet; Refill: 0    2. Fever, unspecified fever cause  -     XR Chest PA & Lateral; Future  -     CBC w AUTO Differential; Future  -     Comprehensive Metabolic Panel; Future  -     levoFLOXacin (Levaquin) 750 MG tablet; Take 1 tablet by mouth Daily for 5 days.  Dispense: 5 tablet; Refill: 0    He was negative for COVID and flu in office.  Will obtain a stat chest x-ray.  Due to his symptoms, will treat with levofloxacin.  Will still check labs today.  Recommend taking medication to reduce fever.            Follow Up   Return for Pending test results.  Patient was given instructions and counseling regarding his condition or for health maintenance advice. Please see specific information pulled into the AVS if appropriate.

## 2025-01-15 NOTE — Clinical Note
Please check his kidney and liver function tomorrow. If they are off, he will need to go to the hospital for fluids.

## 2025-01-15 NOTE — PROGRESS NOTES
I saw him today and the CXR confirmed pneumonia. He has an appt w/ you next month; could you order a CXR at that visit to ensure resolution since I'm leaving the practice? Thanks.

## 2025-01-16 ENCOUNTER — TELEPHONE (OUTPATIENT)
Dept: FAMILY MEDICINE CLINIC | Age: 65
End: 2025-01-16
Payer: COMMERCIAL

## 2025-01-16 DIAGNOSIS — E86.0 DEHYDRATION: Primary | ICD-10-CM

## 2025-01-16 NOTE — TELEPHONE ENCOUNTER
----- Message from No Torrez sent at 1/15/2025  2:41 PM EST -----  Please check his kidney and liver function tomorrow. If they are off, he will need to go to the hospital for fluids.

## 2025-01-20 ENCOUNTER — LAB (OUTPATIENT)
Dept: LAB | Facility: HOSPITAL | Age: 65
End: 2025-01-20
Payer: COMMERCIAL

## 2025-01-20 DIAGNOSIS — E86.0 DEHYDRATION: ICD-10-CM

## 2025-01-20 LAB
ALBUMIN SERPL-MCNC: 3.6 G/DL (ref 3.5–5.2)
ALBUMIN/GLOB SERPL: 1.1 G/DL
ALP SERPL-CCNC: 114 U/L (ref 39–117)
ALT SERPL W P-5'-P-CCNC: 140 U/L (ref 1–41)
ANION GAP SERPL CALCULATED.3IONS-SCNC: 9.3 MMOL/L (ref 5–15)
AST SERPL-CCNC: 68 U/L (ref 1–40)
BILIRUB SERPL-MCNC: 0.6 MG/DL (ref 0–1.2)
BUN SERPL-MCNC: 23 MG/DL (ref 8–23)
BUN/CREAT SERPL: 29.1 (ref 7–25)
CALCIUM SPEC-SCNC: 9 MG/DL (ref 8.6–10.5)
CHLORIDE SERPL-SCNC: 99 MMOL/L (ref 98–107)
CO2 SERPL-SCNC: 26.7 MMOL/L (ref 22–29)
CREAT SERPL-MCNC: 0.79 MG/DL (ref 0.76–1.27)
EGFRCR SERPLBLD CKD-EPI 2021: 98.6 ML/MIN/1.73
GLOBULIN UR ELPH-MCNC: 3.4 GM/DL
GLUCOSE SERPL-MCNC: 195 MG/DL (ref 65–99)
POTASSIUM SERPL-SCNC: 4.4 MMOL/L (ref 3.5–5.2)
PROT SERPL-MCNC: 7 G/DL (ref 6–8.5)
SODIUM SERPL-SCNC: 135 MMOL/L (ref 136–145)

## 2025-01-20 PROCEDURE — 36415 COLL VENOUS BLD VENIPUNCTURE: CPT

## 2025-01-20 PROCEDURE — 80053 COMPREHEN METABOLIC PANEL: CPT

## 2025-02-10 ENCOUNTER — LAB (OUTPATIENT)
Dept: LAB | Facility: HOSPITAL | Age: 65
End: 2025-02-10
Payer: COMMERCIAL

## 2025-02-10 ENCOUNTER — OFFICE VISIT (OUTPATIENT)
Dept: FAMILY MEDICINE CLINIC | Age: 65
End: 2025-02-10
Payer: COMMERCIAL

## 2025-02-10 VITALS
TEMPERATURE: 97.9 F | HEART RATE: 77 BPM | WEIGHT: 210.4 LBS | BODY MASS INDEX: 28.5 KG/M2 | OXYGEN SATURATION: 94 % | SYSTOLIC BLOOD PRESSURE: 116 MMHG | HEIGHT: 72 IN | DIASTOLIC BLOOD PRESSURE: 74 MMHG

## 2025-02-10 DIAGNOSIS — E87.1 HYPONATREMIA: ICD-10-CM

## 2025-02-10 DIAGNOSIS — J18.9 PNEUMONIA OF LEFT LOWER LOBE DUE TO INFECTIOUS ORGANISM: ICD-10-CM

## 2025-02-10 DIAGNOSIS — E78.2 MIXED HYPERLIPIDEMIA: ICD-10-CM

## 2025-02-10 DIAGNOSIS — I10 ESSENTIAL (PRIMARY) HYPERTENSION: ICD-10-CM

## 2025-02-10 DIAGNOSIS — Z00.00 PHYSICAL EXAM: ICD-10-CM

## 2025-02-10 DIAGNOSIS — Z00.00 PHYSICAL EXAM: Primary | ICD-10-CM

## 2025-02-10 DIAGNOSIS — R35.1 NOCTURIA: ICD-10-CM

## 2025-02-10 DIAGNOSIS — R74.01 ELEVATED TRANSAMINASE LEVEL: ICD-10-CM

## 2025-02-10 DIAGNOSIS — I71.21 ANEURYSM OF ASCENDING AORTA WITHOUT RUPTURE: ICD-10-CM

## 2025-02-10 DIAGNOSIS — R22.42 MASS OF LEFT THIGH: ICD-10-CM

## 2025-02-10 LAB
ALBUMIN SERPL-MCNC: 4.4 G/DL (ref 3.5–5.2)
ALBUMIN/GLOB SERPL: 1.6 G/DL
ALP SERPL-CCNC: 77 U/L (ref 39–117)
ALT SERPL W P-5'-P-CCNC: 36 U/L (ref 1–41)
ANION GAP SERPL CALCULATED.3IONS-SCNC: 8 MMOL/L (ref 5–15)
AST SERPL-CCNC: 29 U/L (ref 1–40)
BILIRUB SERPL-MCNC: 0.8 MG/DL (ref 0–1.2)
BUN SERPL-MCNC: 19 MG/DL (ref 8–23)
BUN/CREAT SERPL: 23.8 (ref 7–25)
CALCIUM SPEC-SCNC: 9.6 MG/DL (ref 8.6–10.5)
CHLORIDE SERPL-SCNC: 103 MMOL/L (ref 98–107)
CHOLEST SERPL-MCNC: 165 MG/DL (ref 0–200)
CO2 SERPL-SCNC: 26 MMOL/L (ref 22–29)
CREAT SERPL-MCNC: 0.8 MG/DL (ref 0.76–1.27)
DEPRECATED RDW RBC AUTO: 40 FL (ref 37–54)
EGFRCR SERPLBLD CKD-EPI 2021: 98.2 ML/MIN/1.73
ERYTHROCYTE [DISTWIDTH] IN BLOOD BY AUTOMATED COUNT: 12.5 % (ref 12.3–15.4)
GLOBULIN UR ELPH-MCNC: 2.7 GM/DL
GLUCOSE SERPL-MCNC: 85 MG/DL (ref 65–99)
HCT VFR BLD AUTO: 41.9 % (ref 37.5–51)
HDLC SERPL-MCNC: 63 MG/DL (ref 40–60)
HGB BLD-MCNC: 14.6 G/DL (ref 13–17.7)
LDLC SERPL CALC-MCNC: 83 MG/DL (ref 0–100)
LDLC/HDLC SERPL: 1.29 {RATIO}
MCH RBC QN AUTO: 31.5 PG (ref 26.6–33)
MCHC RBC AUTO-ENTMCNC: 34.8 G/DL (ref 31.5–35.7)
MCV RBC AUTO: 90.3 FL (ref 79–97)
PLATELET # BLD AUTO: 232 10*3/MM3 (ref 140–450)
PMV BLD AUTO: 10.7 FL (ref 6–12)
POTASSIUM SERPL-SCNC: 4.4 MMOL/L (ref 3.5–5.2)
PROT SERPL-MCNC: 7.1 G/DL (ref 6–8.5)
PSA SERPL-MCNC: 0.97 NG/ML (ref 0–4)
RBC # BLD AUTO: 4.64 10*6/MM3 (ref 4.14–5.8)
SODIUM SERPL-SCNC: 137 MMOL/L (ref 136–145)
TRIGL SERPL-MCNC: 105 MG/DL (ref 0–150)
TSH SERPL DL<=0.05 MIU/L-ACNC: 1.99 UIU/ML (ref 0.27–4.2)
VLDLC SERPL-MCNC: 19 MG/DL (ref 5–40)
WBC NRBC COR # BLD AUTO: 7.81 10*3/MM3 (ref 3.4–10.8)

## 2025-02-10 PROCEDURE — 36415 COLL VENOUS BLD VENIPUNCTURE: CPT

## 2025-02-10 PROCEDURE — 80061 LIPID PANEL: CPT

## 2025-02-10 PROCEDURE — 83036 HEMOGLOBIN GLYCOSYLATED A1C: CPT

## 2025-02-10 PROCEDURE — 99397 PER PM REEVAL EST PAT 65+ YR: CPT | Performed by: FAMILY MEDICINE

## 2025-02-10 PROCEDURE — 84153 ASSAY OF PSA TOTAL: CPT

## 2025-02-10 PROCEDURE — 80050 GENERAL HEALTH PANEL: CPT

## 2025-02-10 RX ORDER — LISINOPRIL AND HYDROCHLOROTHIAZIDE 12.5; 2 MG/1; MG/1
1 TABLET ORAL DAILY
Qty: 90 TABLET | Refills: 3 | Status: SHIPPED | OUTPATIENT
Start: 2025-02-10

## 2025-02-10 RX ORDER — ROSUVASTATIN CALCIUM 10 MG/1
10 TABLET, COATED ORAL DAILY
Qty: 90 TABLET | Refills: 3 | Status: SHIPPED | OUTPATIENT
Start: 2025-02-10

## 2025-02-10 NOTE — PROGRESS NOTES
Ry Santo presents to [facility].    Chief Complaint:  Annual physical, blood pressure, cholesterol    Subjective   History of Present Illness:  Simin is in today for physical exam.  He is 64 years old and works for [employer].  He does not smoke after stopping in roughly 2007.  Simin has not really had much alcohol recently.  He tends to use more alcohol during the summer months.  He is up-to-date on colon cancer screening.  He had colonoscopy about 2 years ago.  He is on a 5-year cycle due to tubular adenoma.  He is due for prostate check today.     Simin also has hypertension and elevated cholesterol.  He remains on treatment for these issues.  His blood pressure is well-controlled today.  He does not routinely check it at home.  He states that he will note feeling a bit lightheaded when he raises up from bending over quickly.  He is not sure if that is of concern.    Simin did have pneumonia earlier this month.  He is due for some follow-up testing today related to this.  He did have hyponatremia along with some elevation of the liver enzymes.    Review of Systems:  Review of Systems   Constitutional:  Negative for chills and fever.   Respiratory:  Negative for cough and shortness of breath.    Cardiovascular:  Negative for chest pain and palpitations.   Gastrointestinal:  Negative for abdominal pain, nausea and vomiting.      Objective   Medical History:  Past Medical History:    Allergic    Arthritis    Essential hypertension    HL (hearing loss)    Mixed hyperlipidemia    Pneumonia    Tubular adenoma of colon     Past Surgical History:    COLONOSCOPY    Tubular adenoma    ENDOSCOPY    Bile reflux, gastritis    FINGER SURGERY      Family History   Problem Relation Age of Onset    Diabetes type II Mother     Diabetes Mother     Aortic aneurysm Father     Coronary artery disease Father     Stroke Father      Social History     Tobacco Use    Smoking status: Former     " Current packs/day: 0.00     Average packs/day: 1.5 packs/day for 29.5 years (44.2 ttl pk-yrs)     Types: Cigarettes     Start date: 1976     Quit date: 10/21/2005     Years since quittin.3    Smokeless tobacco: Never   Substance Use Topics    Alcohol use: Not Currently     Alcohol/week: 5.0 standard drinks of alcohol     Types: 5 Cans of beer per week     Comment: 'about 30 beers weekly'       Health Maintenance Due   Topic Date Due    TDAP/TD VACCINES (1 - Tdap) Never done    ZOSTER VACCINE (1 of 2) Never done    BMI FOLLOWUP  2025    LIPID PANEL  2025        Immunization History   Administered Date(s) Administered    COVID-19 (PFIZER) Purple Cap Monovalent 2021, 2021    Fluzone (or Fluarix & Flulaval for VFC) >6mos 10/26/2020    Pneumococcal Conjugate 20-Valent (PCV20) 2024       Allergies   Allergen Reactions    Aspirin Itching    Penicillins Hives        Medications:    Current Outpatient Medications:     lisinopril-hydrochlorothiazide (PRINZIDE,ZESTORETIC) 20-12.5 MG per tablet, Take 1 tablet by mouth Daily., Disp: 90 tablet, Rfl: 3    rosuvastatin (CRESTOR) 10 MG tablet, Take 1 tablet by mouth Daily., Disp: 90 tablet, Rfl: 3    Vital Signs:   /74 (BP Location: Right arm, Patient Position: Sitting)   Pulse 77   Temp 97.9 °F (36.6 °C) (Oral)   Ht 182.9 cm (72.01\")   Wt 95.4 kg (210 lb 6.4 oz)   SpO2 94%   BMI 28.53 kg/m²       Physical Exam:  Physical Exam  Vitals and nursing note reviewed. Chaperone present: patient declined chaperone.   Constitutional:       General: He is not in acute distress.     Appearance: He is not ill-appearing.   HENT:      Right Ear: Tympanic membrane and ear canal normal.      Left Ear: Tympanic membrane and ear canal normal.      Mouth/Throat:      Mouth: Mucous membranes are moist.      Comments: Pharynx appears normal  Eyes:      Extraocular Movements: Extraocular movements intact.      Pupils: Pupils are equal, round, and " reactive to light.   Neck:      Thyroid: No thyromegaly.   Cardiovascular:      Rate and Rhythm: Normal rate and regular rhythm.      Heart sounds: No murmur heard.  Pulmonary:      Effort: Pulmonary effort is normal.      Breath sounds: Normal breath sounds.   Abdominal:      General: There is no distension.      Palpations: Abdomen is soft. There is no mass.      Tenderness: There is no abdominal tenderness.   Genitourinary:     Prostate: Not enlarged and no nodules present.      Rectum: Guaiac result negative. No mass.   Musculoskeletal:      Cervical back: Normal range of motion.   Skin:     Findings: No lesion (There is a left thigh mass that is noted about custodial down the thigh medially.  It is approximately an inch in diameter.) or rash.   Neurological:      General: No focal deficit present.      Mental Status: He is oriented to person, place, and time.      Cranial Nerves: No cranial nerve deficit.   Psychiatric:         Mood and Affect: Mood normal.     Result Review   The following data was reviewed by Ward Rdz MD on 02/10/2025.  Lab Results   Component Value Date    WBC 9.73 01/15/2025    HGB 13.8 01/15/2025    HCT 40.1 01/15/2025    MCV 90.5 01/15/2025     01/15/2025     Lab Results   Component Value Date    GLUCOSE 195 (H) 01/20/2025    BUN 23 01/20/2025    CREATININE 0.79 01/20/2025     (L) 01/20/2025    K 4.4 01/20/2025    CL 99 01/20/2025    CALCIUM 9.0 01/20/2025    PROTEINTOT 7.0 01/20/2025    ALBUMIN 3.6 01/20/2025     (H) 01/20/2025    AST 68 (H) 01/20/2025    ALKPHOS 114 01/20/2025    BILITOT 0.6 01/20/2025    GLOB 3.4 01/20/2025    AGRATIO 1.1 01/20/2025    BCR 29.1 (H) 01/20/2025    ANIONGAP 9.3 01/20/2025    EGFR 98.6 01/20/2025     Lab Results   Component Value Date    CHOL 142 03/05/2024    CHLPL 143 04/02/2021    TRIG 99 03/05/2024    HDL 58 03/05/2024    LDL 66 03/05/2024     Lab Results   Component Value Date    TSH 1.370 03/05/2024     Lab Results    Component Value Date    HGBA1C 6.10 (H) 03/05/2024     Lab Results   Component Value Date    PSA 0.721 03/05/2024    PSA 0.617 02/14/2023    PSA 0.639 10/21/2021     BMI is >= 25 and <30. (Overweight) The following options were offered after discussion;: exercise counseling/recommendations and nutrition counseling/recommendations         Assessment and Plan:   Today, we have reviewed Simin's care.  Overall, he seems well.  Regarding the annual physical, he is currently up-to-date on recommended cancer screenings.  We will move ahead with PSA.  Vaccines have been reviewed as well.  Regarding his usual care, we will refill his medications and update labs and chest x-ray as below.  The pneumonia has resolved, and hopefully, his labs will be back to normal.  The left thigh mass may be where he had an abscess that became indurated and walled off.  As a precaution though, we will arrange an ultrasound and likely refer him to general surgery for evaluation of this.  Otherwise, we will tentatively plan to see Simin back in about a year, sooner if needed.    Diagnoses and all orders for this visit:    1. Physical exam (Primary)  -     CBC (No Diff); Future  -     Comprehensive Metabolic Panel; Future  -     Lipid Panel; Future  -     TSH; Future  -     Hemoglobin A1c; Future  -     PSA DIAGNOSTIC; Future    2. Essential (primary) hypertension  -     lisinopril-hydrochlorothiazide (PRINZIDE,ZESTORETIC) 20-12.5 MG per tablet; Take 1 tablet by mouth Daily.  Dispense: 90 tablet; Refill: 3  -     Comprehensive Metabolic Panel; Future  -     Lipid Panel; Future    3. Mixed hyperlipidemia  -     rosuvastatin (CRESTOR) 10 MG tablet; Take 1 tablet by mouth Daily.  Dispense: 90 tablet; Refill: 3  -     Lipid Panel; Future  -     TSH; Future    4. Aneurysm of ascending aorta without rupture  Comments:  Continue follow up with cardiothoracic.    5. Pneumonia of left lower lobe due to infectious organism  -     XR Chest 2 View;  Future    6. Hyponatremia  -     Comprehensive Metabolic Panel; Future    7. Elevated transaminase level  -     Comprehensive Metabolic Panel; Future    8. Mass of left thigh  -     US Nonvascular Extremity Limited; Future    9. Nocturia  -     PSA DIAGNOSTIC; Future    Follow Up  Return in about 1 year (around 2/10/2026) for Recheck, Medicare Wellness.  Patient was given instructions and counseling regarding his condition or for health maintenance advice. Please see specific information pulled into the AVS if appropriate.

## 2025-02-11 ENCOUNTER — HOSPITAL ENCOUNTER (OUTPATIENT)
Dept: GENERAL RADIOLOGY | Facility: HOSPITAL | Age: 65
Discharge: HOME | End: 2025-02-11
Admitting: FAMILY MEDICINE
Payer: COMMERCIAL

## 2025-02-11 DIAGNOSIS — J18.9 PNEUMONIA OF LEFT LOWER LOBE DUE TO INFECTIOUS ORGANISM: ICD-10-CM

## 2025-02-11 LAB — HBA1C MFR BLD: 5.8 % (ref 4.8–5.6)

## 2025-02-11 PROCEDURE — 71046 X-RAY EXAM CHEST 2 VIEWS: CPT

## 2025-02-27 ENCOUNTER — HOSPITAL ENCOUNTER (OUTPATIENT)
Dept: ULTRASOUND IMAGING | Facility: HOSPITAL | Age: 65
Discharge: HOME | End: 2025-02-27
Payer: COMMERCIAL

## 2025-02-27 ENCOUNTER — HOSPITAL ENCOUNTER (OUTPATIENT)
Dept: CT IMAGING | Facility: HOSPITAL | Age: 65
Discharge: HOME | End: 2025-02-27
Payer: COMMERCIAL

## 2025-02-27 DIAGNOSIS — R22.42 MASS OF LEFT THIGH: ICD-10-CM

## 2025-02-27 DIAGNOSIS — I71.21 ANEURYSM OF ASCENDING AORTA WITHOUT RUPTURE: ICD-10-CM

## 2025-02-27 DIAGNOSIS — J18.9 PNEUMONIA OF LEFT LOWER LOBE DUE TO INFECTIOUS ORGANISM: ICD-10-CM

## 2025-02-27 PROCEDURE — 76882 US LMTD JT/FCL EVL NVASC XTR: CPT

## 2025-02-27 PROCEDURE — 71250 CT THORAX DX C-: CPT

## 2025-03-03 DIAGNOSIS — R22.42 MASS OF LEFT THIGH: Primary | ICD-10-CM

## 2025-06-09 ENCOUNTER — TELEPHONE (OUTPATIENT)
Dept: FAMILY MEDICINE CLINIC | Age: 65
End: 2025-06-09
Payer: COMMERCIAL

## 2025-06-09 DIAGNOSIS — I71.21 ANEURYSM OF ASCENDING AORTA WITHOUT RUPTURE: Primary | ICD-10-CM

## 2025-06-09 DIAGNOSIS — R91.1 NODULE OF LOWER LOBE OF RIGHT LUNG: ICD-10-CM

## 2025-06-09 NOTE — TELEPHONE ENCOUNTER
----- Message from Jessica BELL sent at 3/3/2025 11:17 AM EST -----  TICKLE for CT chest without contrast after 6/6/2025 for ascending aortic aneurysm, right lower lobe lung nodule.

## 2025-06-17 ENCOUNTER — HOSPITAL ENCOUNTER (OUTPATIENT)
Dept: CT IMAGING | Facility: HOSPITAL | Age: 65
Discharge: HOME | End: 2025-06-17
Admitting: FAMILY MEDICINE
Payer: COMMERCIAL

## 2025-06-17 DIAGNOSIS — R91.1 NODULE OF LOWER LOBE OF RIGHT LUNG: ICD-10-CM

## 2025-06-17 DIAGNOSIS — I71.21 ANEURYSM OF ASCENDING AORTA WITHOUT RUPTURE: ICD-10-CM

## 2025-06-17 PROCEDURE — 71250 CT THORAX DX C-: CPT

## 2025-07-12 ENCOUNTER — RESULTS FOLLOW-UP (OUTPATIENT)
Dept: FAMILY MEDICINE CLINIC | Age: 65
End: 2025-07-12
Payer: COMMERCIAL

## 2025-08-28 ENCOUNTER — TELEPHONE (OUTPATIENT)
Dept: FAMILY MEDICINE CLINIC | Age: 65
End: 2025-08-28
Payer: COMMERCIAL

## 2025-08-28 DIAGNOSIS — N20.1 LEFT URETERAL STONE: Primary | ICD-10-CM

## 2025-08-29 ENCOUNTER — LAB (OUTPATIENT)
Dept: LAB | Facility: HOSPITAL | Age: 65
End: 2025-08-29
Payer: COMMERCIAL

## 2025-08-29 ENCOUNTER — HOSPITAL ENCOUNTER (OUTPATIENT)
Dept: CT IMAGING | Facility: HOSPITAL | Age: 65
Discharge: HOME | End: 2025-08-29
Payer: COMMERCIAL

## 2025-08-29 DIAGNOSIS — N20.1 LEFT URETERAL STONE: ICD-10-CM

## 2025-08-29 LAB
BACTERIA UR QL AUTO: NORMAL /HPF
BILIRUB UR QL STRIP: NEGATIVE
CLARITY UR: CLEAR
COLOR UR: YELLOW
GLUCOSE UR STRIP-MCNC: NEGATIVE MG/DL
HGB UR QL STRIP.AUTO: NEGATIVE
KETONES UR QL STRIP: ABNORMAL
LEUKOCYTE ESTERASE UR QL STRIP.AUTO: NEGATIVE
NITRITE UR QL STRIP: NEGATIVE
PH UR STRIP.AUTO: 7 [PH] (ref 5–8)
PROT UR QL STRIP: NEGATIVE
RBC # UR STRIP: NORMAL /HPF
REF LAB TEST METHOD: NORMAL
SP GR UR STRIP: 1.02 (ref 1–1.03)
SQUAMOUS #/AREA URNS HPF: NORMAL /HPF
UROBILINOGEN UR QL STRIP: ABNORMAL
WBC # UR STRIP: NORMAL /HPF

## 2025-08-29 PROCEDURE — 81001 URINALYSIS AUTO W/SCOPE: CPT

## 2025-08-29 PROCEDURE — 74176 CT ABD & PELVIS W/O CONTRAST: CPT
